# Patient Record
Sex: MALE | Race: WHITE | NOT HISPANIC OR LATINO | Employment: UNEMPLOYED | RURAL
[De-identification: names, ages, dates, MRNs, and addresses within clinical notes are randomized per-mention and may not be internally consistent; named-entity substitution may affect disease eponyms.]

---

## 2024-04-04 ENCOUNTER — OFFICE VISIT (OUTPATIENT)
Dept: FAMILY MEDICINE CLINIC | Facility: CLINIC | Age: 54
End: 2024-04-04
Payer: MEDICAID

## 2024-04-04 VITALS
BODY MASS INDEX: 42.95 KG/M2 | HEIGHT: 70 IN | OXYGEN SATURATION: 96 % | WEIGHT: 300 LBS | HEART RATE: 82 BPM | SYSTOLIC BLOOD PRESSURE: 142 MMHG | DIASTOLIC BLOOD PRESSURE: 86 MMHG

## 2024-04-04 DIAGNOSIS — Z12.2 SCREENING FOR LUNG CANCER: ICD-10-CM

## 2024-04-04 DIAGNOSIS — Z82.49 FAMILY HISTORY OF CHF (CONGESTIVE HEART FAILURE): ICD-10-CM

## 2024-04-04 DIAGNOSIS — Z11.59 NEED FOR HEPATITIS C SCREENING TEST: ICD-10-CM

## 2024-04-04 DIAGNOSIS — Z13.1 SCREENING FOR DIABETES MELLITUS: ICD-10-CM

## 2024-04-04 DIAGNOSIS — E66.01 MORBID (SEVERE) OBESITY DUE TO EXCESS CALORIES: ICD-10-CM

## 2024-04-04 DIAGNOSIS — E03.9 HYPOTHYROIDISM (ACQUIRED): ICD-10-CM

## 2024-04-04 DIAGNOSIS — Z12.11 SCREENING FOR COLON CANCER: ICD-10-CM

## 2024-04-04 DIAGNOSIS — Z12.5 SCREENING FOR PROSTATE CANCER: ICD-10-CM

## 2024-04-04 DIAGNOSIS — R53.83 OTHER FATIGUE: ICD-10-CM

## 2024-04-04 DIAGNOSIS — I10 BENIGN ESSENTIAL HTN: ICD-10-CM

## 2024-04-04 DIAGNOSIS — Z00.00 ANNUAL PHYSICAL EXAM: Primary | ICD-10-CM

## 2024-04-04 DIAGNOSIS — E78.2 MIXED HYPERLIPIDEMIA: ICD-10-CM

## 2024-04-04 DIAGNOSIS — Z72.0 TOBACCO USE: ICD-10-CM

## 2024-04-04 DIAGNOSIS — R00.2 PALPITATIONS: ICD-10-CM

## 2024-04-04 DIAGNOSIS — R25.2 CRAMP AND SPASM: ICD-10-CM

## 2024-04-04 RX ORDER — LEVOTHYROXINE SODIUM 0.15 MG/1
1 TABLET ORAL EVERY MORNING
COMMUNITY

## 2024-04-04 RX ORDER — HYDROCHLOROTHIAZIDE 25 MG/1
1 TABLET ORAL EVERY MORNING
COMMUNITY

## 2024-04-04 RX ORDER — ROSUVASTATIN CALCIUM 10 MG/1
1 TABLET, COATED ORAL DAILY
COMMUNITY
Start: 2024-03-19 | End: 2024-04-04

## 2024-04-04 RX ORDER — LEVOTHYROXINE SODIUM 137 UG/1
1 TABLET ORAL DAILY
COMMUNITY
End: 2024-04-04

## 2024-04-04 NOTE — ASSESSMENT & PLAN NOTE
Labs drawn.  Will order home sleep study.  Informed him if he has not received that in the mail within the next 2 weeks to contact me and let me know.  Call afterwards for results.  Proper sleep hygiene discussed and encouraged.  Proper diet and exercise plan discussed and encouraged.  Return to clinic or ED with any issues or concerns.

## 2024-04-04 NOTE — ASSESSMENT & PLAN NOTE
Labs drawn.  UA completed.  Blood pressure mildly elevated today in clinic.  He will monitor blood pressure daily and keep a log.  Goal blood pressure less than 140/90.  He will let me know if it does not come down to below the goal.  Continue with current medications.  PHQ-9 completed and discussed.  No thoughts of suicide or hurting himself or anyone else.  Proper diet and exercise plan discussed and encouraged.  Will schedule sleep study.  Will schedule colonoscopy.  Will schedule low-dose lung CT scan.  Patient denies pneumonia tetanus shingles vaccine at this time and understands the risks.  He states he will discuss further COVID vaccines with his pharmacist.  Annual dental and eye exams encouraged.  Risk of meds discussed and understood.  Return to clinic or ED with any issues or concerns.

## 2024-04-04 NOTE — ASSESSMENT & PLAN NOTE
EKG completed.  Labs drawn.  Proper diet and exercise plan discussed and encouraged.  Will place cardiology referral.  Go to ED if occurs again.  Stay hydrated with water.  Return to clinic or ED with any issues or concerns.

## 2024-04-04 NOTE — PATIENT INSTRUCTIONS
Obesity, Adult  Obesity is the condition of having too much total body fat. Being overweight or obese means that your weight is greater than what is considered healthy for your body size. Obesity is determined by a measurement called BMI (body mass index). BMI is an estimate of body fat and is calculated from height and weight. For adults, a BMI of 30 or higher is considered obese.  Obesity can lead to other health concerns and major illnesses, including:  Stroke.  Coronary artery disease (CAD).  Type 2 diabetes.  Some types of cancer, including cancers of the colon, breast, uterus, and gallbladder.  High blood pressure (hypertension).  High cholesterol.  Gallbladder stones.  Obesity can also contribute to:  Osteoarthritis.  Sleep apnea.  Infertility problems.  What are the causes?  Common causes of this condition include:  Eating daily meals that are high in calories, sugar, and fat.  Drinking high amounts of sugar-sweetened beverages, such as soft drinks.  Being born with genes that may make you more likely to become obese.  Having a medical condition that causes obesity, including:  Hypothyroidism.  Polycystic ovarian syndrome (PCOS).  Binge-eating disorder.  Cushing syndrome.  Taking certain medicines, such as steroids, antidepressants, and seizure medicines.  Not being physically active (sedentary lifestyle).  Not getting enough sleep.  What increases the risk?  The following factors may make you more likely to develop this condition:  Having a family history of obesity.  Living in an area with limited access to:  [place], recreation centers, or sidewalks.  Healthy food choices, such as grocery stores and [employer] markets.  What are the signs or symptoms?  The main sign of this condition is having too much body fat.  How is this diagnosed?  This condition is diagnosed based on:  Your BMI. If you are an adult with a BMI of 30 or higher, you are considered obese.  Your waist circumference. This measures the  distance around your waistline.  Your skinfold thickness. Your health care provider may gently pinch a fold of your skin and measure it.  You may have other tests to check for underlying conditions.  How is this treated?  Treatment for this condition often includes changing your lifestyle. Treatment may include some or all of the following:  Dietary changes. This may include developing a healthy meal plan.  Regular physical activity. This may include activity that causes your heart to beat faster (aerobic exercise) and strength training. Work with your health care provider to design an exercise program that works for you.  Medicine to help you lose weight if you are unable to lose one pound a week after six weeks of healthy eating and more physical activity.  Treating conditions that cause the obesity (underlying conditions).  Surgery. Surgical options may include gastric banding and gastric bypass. Surgery may be done if:  Other treatments have not helped to improve your condition.  You have a BMI of 40 or higher.  You have life-threatening health problems related to obesity.  Follow these instructions at home:  Eating and drinking    Follow recommendations from your health care provider about what you eat and drink. Your health care provider may advise you to:  Limit fast food, sweets, and processed snack foods.  Choose low-fat options, such as low-fat milk instead of whole milk.  Eat five or more servings of fruits or vegetables every day.  Choose healthy foods when you eat out.  Keep low-fat snacks available.  Limit sugary drinks, such as soda, fruit juice, sweetened iced tea, and flavored milk.  Drink enough water to keep your urine pale yellow.  Do not follow a fad diet. Fad diets can be unhealthy and even dangerous.  Other healthful choices include:  Eat at home more often. This gives you more control over what you eat.  Learn to read food labels. This will help you understand how much food is considered one  serving.  Learn what a healthy serving size is.  Physical activity  Exercise regularly, as told by your health care provider.  Most adults should get up to 150 minutes of moderate-intensity exercise every week.  Ask your health care provider what types of exercise are safe for you and how often you should exercise.  Warm up and stretch before being active.  Cool down and stretch after being active.  Rest between periods of activity.  Lifestyle  Work with your health care provider and a dietitian to set a weight-loss goal that is healthy and reasonable for you.  Limit your screen time.  Find ways to reward yourself that do not involve food.  Do not drink alcohol if:  Your health care provider tells you not to drink.  You are pregnant, may be pregnant, or are planning to become pregnant.  If you drink alcohol:  Limit how much you have to:  0-1 drink a day for women.  0-2 drinks a day for men.  Know how much alcohol is in your drink. In the [place] one drink equals one 12 oz bottle of beer (355 mL), one 5 oz glass of wine (148 mL), or one 1½ oz glass of hard liquor (44 mL).  General instructions  Keep a weight-loss journal to keep track of the food you eat and how much exercise you get.  Take over-the-counter and prescription medicines only as told by your health care provider.  Take vitamins and supplements only as told by your health care provider.  Consider joining a support group. Your health care provider may be able to recommend a support group.  Pay attention to your mental health as obesity can lead to depression or self esteem issues.  Keep all follow-up visits. This is important.  Contact a health care provider if:  You are unable to meet your weight-loss goal after six weeks of dietary and lifestyle changes.  You have trouble breathing.  Summary  Obesity is the condition of having too much total body fat.  Being overweight or obese means that your weight is greater than what is considered healthy for your body  size.  Work with your health care provider and a dietitian to set a weight-loss goal that is healthy and reasonable for you.  Exercise regularly, as told by your health care provider. Ask your health care provider what types of exercise are safe for you and how often you should exercise.  This information is not intended to replace advice given to you by your health care provider. Make sure you discuss any questions you have with your health care provider.  Document Revised: 07/26/2022 Document Reviewed: 07/26/2022  [employer] Patient Education © 2023 [employer]    Exercising to Lose Weight  Getting regular exercise is important for everyone. It is especially important if you are overweight. Being overweight increases your risk of heart disease, stroke, diabetes, high blood pressure, and several types of cancer. Exercising, and reducing the calories you consume, can help you lose weight and improve fitness and health.  Exercise can be moderate or vigorous intensity. To lose weight, most people need to do a certain amount of moderate or vigorous-intensity exercise each week.  How can exercise affect me?  You lose weight when you exercise enough to burn more calories than you eat. Exercise also reduces body fat and builds muscle. The more muscle you have, the more calories you burn. Exercise also:  Improves mood.  Reduces stress and tension.  Improves your overall fitness, flexibility, and endurance.  Increases bone strength.  Moderate-intensity exercise    Moderate-intensity exercise is any activity that gets you moving enough to burn at least three times more energy (calories) than if you were sitting.  Examples of moderate exercise include:  Walking a mile in 15 minutes.  Doing light yard work.  Biking at an easy pace.  Most people should get at least 150 minutes of moderate-intensity exercise a week to maintain their body weight.  Vigorous-intensity exercise  Vigorous-intensity exercise is any activity that gets  you moving enough to burn at least six times more calories than if you were sitting. When you exercise at this intensity, you should be working hard enough that you are not able to carry on a conversation.  Examples of vigorous exercise include:  Running.  Playing a team sport, such as football, basketball, and soccer.  Jumping rope.  Most people should get at least 75 minutes a week of vigorous exercise to maintain their body weight.  What actions can I take to lose weight?  The amount of exercise you need to lose weight depends on:  Your age.  The type of exercise.  Any health conditions you have.  Your overall physical ability.  Talk to your health care provider about how much exercise you need and what types of activities are safe for you.  Nutrition    Make changes to your diet as told by your health care provider or diet and nutrition specialist (dietitian). This may include:  Eating fewer calories.  Eating more protein.  Eating less unhealthy fats.  Eating a diet that includes fresh fruits and vegetables, whole grains, low-fat dairy products, and lean protein.  Avoiding foods with added fat, salt, and sugar.  Drink plenty of water while you exercise to prevent dehydration or heat stroke.  Activity  Choose an activity that you enjoy and set realistic goals. Your health care provider can help you make an exercise plan that works for you.  Exercise at a moderate or vigorous intensity most days of the week.  The intensity of exercise may vary from person to person. You can tell how intense a workout is for you by paying attention to your breathing and heartbeat. Most people will notice their breathing and heartbeat get faster with more intense exercise.  Do resistance training twice each week, such as:  Push-ups.  Sit-ups.  Lifting weights.  Using resistance bands.  Getting short amounts of exercise can be just as helpful as long, structured periods of exercise. If you have trouble finding time to exercise, try  doing these things as part of your daily routine:  Get up, stretch, and walk around every 30 minutes throughout the day.  Go for a walk during your lunch break.  Park your car farther away from your destination.  If you take public transportation, get off one stop early and walk the rest of the way.  Make phone calls while standing up and walking around.  Take the stairs instead of elevators or escalators.  Wear comfortable clothes and shoes with good support.  Do not exercise so much that you hurt yourself, feel dizzy, or get very short of breath.  Where to find more information  [place] Department of Health and Human Services: www.hhs.gov  Centers for Disease Control and Prevention: www.cdc.gov  Contact a health care provider:  Before starting a new exercise program.  If you have questions or concerns about your weight.  If you have a medical problem that keeps you from exercising.  Get help right away if:  You have any of the following while exercising:  Injury.  Dizziness.  Difficulty breathing or shortness of breath that does not go away when you stop exercising.  Chest pain.  Rapid heartbeat.  These symptoms may represent a serious problem that is an emergency. Do not wait to see if the symptoms will go away. Get medical help right away. Call your local emergency services (911 in the [place] Do not drive yourself to the hospital.  Summary  Getting regular exercise is especially important if you are overweight.  Being overweight increases your risk of heart disease, stroke, diabetes, high blood pressure, and several types of cancer.  Losing weight happens when you burn more calories than you eat.  Reducing the amount of calories you eat, and getting regular moderate or vigorous exercise each week, helps you lose weight.  This information is not intended to replace advice given to you by your health care provider. Make sure you discuss any questions you have with your health care provider.  Document Revised:  02/13/2022 Document Reviewed: 02/13/2022  [employer] Patient Education © 2023 [employer]    MyPlate from [employer]    MyPlate is an outline of a general healthy diet based on the Dietary Guidelines for Americans, 4175-1582, from the [place] Department of Agriculture (USDA). It sets guidelines for how much food you should eat from each food group based on your age, sex, and level of physical activity.  What are tips for following MyPlate?  To follow MyPlate recommendations:  Eat a wide variety of fruits and vegetables, grains, and protein foods.  Serve smaller portions and eat less food throughout the day.  Limit portion sizes to avoid overeating.  Enjoy your food.  Get at least 150 minutes of exercise every week. This is about 30 minutes each day, 5 or more days per week.  It can be difficult to have every meal look like MyPlate. Think about MyPlate as eating guidelines for an entire day, rather than each individual meal.  Fruits and vegetables  Make one half of your plate fruits and vegetables.  Eat many different colors of fruits and vegetables each day.  For a 2,000-calorie daily food plan, eat:  2½ cups of vegetables every day.  2 cups of fruit every day.  1 cup is equal to:  1 cup raw or cooked vegetables.  1 cup raw fruit.  1 medium-sized orange, apple, or banana.  1 cup 100% fruit or vegetable juice.  2 cups raw leafy greens, such as lettuce, spinach, or kale.  ½ cup dried fruit.  Grains  One fourth of your plate should be grains.  Make at least half of the grains you eat each day whole grains.  For a 2,000-calorie daily food plan, eat 6 oz of grains every day.  1 oz is equal to:  1 slice bread.  1 cup cereal.  ½ cup cooked rice, cereal, or pasta.  Protein  One fourth of your plate should be protein.  Eat a wide variety of protein foods, including meat, poultry, fish, eggs, beans, nuts, and tofu.  For a 2,000-calorie daily food plan, eat 5½ oz of protein every day.  1 oz is equal to:  1 oz meat, poultry, or fish.  ¼  cup cooked beans.  1 egg.  ½ oz nuts or seeds.  1 Tbsp peanut butter.  Dairy  Drink fat-free or low-fat (1%) milk.  Eat or drink dairy as a side to meals.  For a 2,000-calorie daily food plan, eat or drink 3 cups of dairy every day.  1 cup is equal to:  1 cup milk, yogurt, cottage cheese, or soy milk (soy beverage).  2 oz processed cheese.  1½ oz natural cheese.  Fats, oils, salt, and sugars  Only small amounts of oils are recommended.  Avoid foods that are high in calories and low in nutritional value (empty calories), like foods high in fat or added sugars.  Choose foods that are low in salt (sodium). Choose foods that have less than 140 milligrams (mg) of sodium per serving.  Drink water instead of sugary drinks. Drink enough fluid to keep your urine pale yellow.  Where to find support  Work with your health care provider or a dietitian to develop a customized eating plan that is right for you.  Download an foster (mobile application) to help you track your daily food intake.  Where to find more information  USDA: ChooseMyPlate.gov  Summary  MyPlate is a general guideline for healthy eating from the USDA. It is based on the Dietary Guidelines for Americans, 0214-7868.  In general, fruits and vegetables should take up one half of your plate, grains should take up one fourth of your plate, and protein should take up one fourth of your plate.  This information is not intended to replace advice given to you by your health care provider. Make sure you discuss any questions you have with your health care provider.  Document Revised: 11/08/2021 Document Reviewed: 11/08/2021  [employer] Patient Education © 2023 [employer]    Steps to Quit Smoking  Smoking tobacco is the leading cause of preventable death. It can affect almost every organ in the body. Smoking puts you and those around you at risk for developing many serious chronic diseases.  Quitting smoking can be very challenging. Do not get discouraged if you are not  successful the first time. Some people need to make many attempts to quit before they achieve long-term success. Do your best to stick to your quit plan, and talk with your health care provider if you have any questions or concerns.  How do I get ready to quit?  When you decide to quit smoking, create a plan to help you succeed. Before you quit:  Pick a date to quit. Set a date within the next 2 weeks to give you time to prepare.  Write down the reasons why you are quitting. Keep this list in places where you will see it often.  Tell your family, friends, and co-workers that you are quitting. Support from people you are close to can make quitting easier.  Talk with your health care provider about your options for quitting smoking.  Find out what treatment options are covered by your health insurance.  Identify people, places, things, and activities that make you want to smoke (triggers). Avoid them.  What first steps can I take to quit smoking?  Throw away all cigarettes at home, at work, and in your car.  Throw away smoking accessories, such as ashtrays and lighters.  Clean your car. Make sure to empty the ashtray.  Clean your home, including curtains and carpets.  What strategies can I use to quit smoking?  Talk with your health care provider about combining strategies, such as taking medicines while you are also receiving in-person counseling. Using these two strategies together makes you more likely to succeed in quitting than if you used either strategy on its own.  If you are pregnant or breastfeeding, talk with your health care provider about finding counseling or other support strategies to quit smoking. Do not take medicine to help you quit smoking unless your health care provider tells you to.  Quit right away  Quit smoking completely, instead of gradually reducing how much you smoke over a period of time. Stopping smoking right away may be more successful than gradually quitting.  Attend in-person  counseling to help you build problem-solving skills. You are more likely to succeed in quitting if you attend counseling sessions regularly. Even short sessions of 10 minutes can be effective.  Take medicine  You may take medicines to help you quit smoking. Some medicines require a prescription. You can also purchase over-the-counter medicines. Medicines may have nicotine in them to replace the nicotine in cigarettes. Medicines may:  Help to stop cravings.  Help to relieve withdrawal symptoms.  Your health care provider may recommend:  Nicotine patches, gum, or lozenges.  Nicotine inhalers or sprays.  Non-nicotine medicine that you take by mouth.  Find resources  Find resources and support systems that can help you quit smoking and remain smoke-free after you quit. These resources are most helpful when you use them often. They include:  Online chats with a counselor.  Telephone quitlines.  Printed self-help materials.  Support groups or group counseling.  Text messaging programs.  Mobile phone apps or applications. Use apps that can help you stick to your quit plan by providing reminders, tips, and encouragement. Examples of free services include Quit Guide from the CDC and smokefree.gov    What can I do to make it easier to quit?    Reach out to your family and friends for support and encouragement. Call telephone quitlines, such as 800-QUIT-NOW, reach out to support groups, or work with a counselor for support.  Ask people who smoke to avoid smoking around you.  Avoid places that trigger you to smoke, such as bars, parties, or smoke-break areas at work.  Spend time with people who do not smoke.  Lessen the stress in your life. Stress can be a smoking trigger for some people. To lessen stress, try:  Exercising regularly.  Doing deep-breathing exercises.  Doing yoga.  Meditating.  What benefits will I see if I quit smoking?  Over time, you should start to see positive results, such as:  Improved sense of smell and  taste.  Decreased coughing and sore throat.  Slower heart rate.  Lower blood pressure.  Clearer and healthier skin.  The ability to breathe more easily.  Fewer sick days.  Summary  Quitting smoking can be very challenging. Do not get discouraged if you are not successful the first time. Some people need to make many attempts to quit before they achieve long-term success.  When you decide to quit smoking, create a plan to help you succeed.  Quit smoking right away, not slowly over a period of time.  Find resources and support systems that can help you quit smoking and remain smoke-free after you quit.  This information is not intended to replace advice given to you by your health care provider. Make sure you discuss any questions you have with your health care provider.  Document Revised: 12/09/2022 Document Reviewed: 12/09/2022  Elsevier Patient Education © 2023 [employer]

## 2024-04-04 NOTE — PROGRESS NOTES
Chief Complaint  Establish Care    Subjective          Fred Encarnacion presents to [facility] for preventative yearly exam.   History of Present Illness    Patient presents for annual exam.  Quit smoking in November 2023 and states he smoked over a pack a day for 30 years.  Tries to watch his diet but admits that he could do better.  Walks daily.  Past history of hypertension hyperlipidemia hypothyroidism and doing well on current medications which include hydrochlorothiazide and levothyroxine.  States his levothyroxine was just increased a couple of weeks ago.  No dizziness no headache no chest pain no chest pressure no shortness of breath no trouble breathing no urinary or bowel issues.    States for at least the past month he has felt worsening fatigue and has had random all over muscle/body cramps.  States he has been staying hydrated and drinking plenty of water and the cramps are not stopping.  States he went to urgent care recently and actually just finished a round of Cipro yesterday as they were concerned for possible infection but states this did not help the cramps at all.  Would like to have some blood work completed.  States sometimes he does have some sores on his skin but no obvious tick that he has pulled off.    He also states that he has intermittent leg swelling for the past 1 to 2 years which is why he is on hydrochlorothiazide.  States that this medication does help the swelling.  He also states he has episodes of random palpitations.  States he has never had a good cardiac evaluation and states his father does have congestive heart failure.  Again, no chest pain no chest pressure no shortness of breath.    He is due a colonoscopy.  He is due a low-dose lung CT scan.  States he will discuss COVID vaccines further with his pharmacist.  Denies tetanus and shingles and pneumonia vaccines at this time and understands the risks.    Objective   Vital Signs:   /86    "Pulse 82   Ht 177.8 cm (70\")   Wt 136 kg (300 lb)   SpO2 96%   BMI 43.05 kg/m²     Body mass index is 43.05 kg/m².    Predictive Model Details   No score data available for Risk of Fall        PHQ-9 Depression Screening  Little interest or pleasure in doing things? 0-->not at all   Feeling down, depressed, or hopeless? 0-->not at all   Trouble falling or staying asleep, or sleeping too much?     Feeling tired or having little energy?     Poor appetite or overeating?     Feeling bad about yourself - or that you are a failure or have let yourself or your family down?     Trouble concentrating on things, such as reading the newspaper or watching television?     Moving or speaking so slowly that other people could have noticed? Or the opposite - being so fidgety or restless that you have been moving around a lot more than usual?     Thoughts that you would be better off dead, or of hurting yourself in some way?     PHQ-9 Total Score 0   If you checked off any problems, how difficult have these problems made it for you to do your work, take care of things at home, or get along with other people?       Patient screened positive for depression based on a PHQ-9 score of 0 on 4/4/2024. Follow-up recommendations include:        Immunization History   Administered Date(s) Administered    COVID-19 (MODERNA) 1st,2nd,3rd Dose Monovalent 03/26/2021, 04/19/2021    COVID-19 (MODERNA) Monovalent Original Booster 11/05/2021, 01/24/2022    COVID-19 (UNSPECIFIED) 11/08/2022    Td (TDVAX) 06/01/1996       Review of Systems   Constitutional:  Positive for fatigue. Negative for chills, unexpected weight gain and unexpected weight loss.   HENT: Negative.     Eyes: Negative.    Respiratory: Negative.     Cardiovascular:  Positive for palpitations. Negative for chest pain and leg swelling.   Gastrointestinal: Negative.    Endocrine: Negative for polydipsia, polyphagia and polyuria.   Genitourinary: Negative.    Musculoskeletal:  Positive " for myalgias. Negative for arthralgias, gait problem, joint swelling, neck pain and neck stiffness.   Skin:  Negative for rash, skin lesions and wound.   Neurological: Negative.    Psychiatric/Behavioral: Negative.         Past History:  Medical History: has a past medical history of Hypothyroidism.   Surgical History: has a past surgical history that includes Tonsillectomy and [place] tooth extraction (N/A).   Family History: family history includes Cancer in his maternal grandfather, maternal uncle, paternal aunt, paternal grandmother, and paternal uncle; Diabetes in his maternal aunt; Heart disease in his father; Kidney disease in his paternal grandfather; Stroke in his paternal aunt and paternal uncle.   Social History: reports that he has quit smoking. His smoking use included cigarettes. He started smoking about 15 months ago. He has been exposed to tobacco smoke. He has never used smokeless tobacco. He reports that he does not currently use alcohol. He reports that he does not use drugs.      Current Outpatient Medications:     hydroCHLOROthiazide 25 MG tablet, Take 1 tablet by mouth Every Morning., Disp: , Rfl:     levothyroxine (SYNTHROID, LEVOTHROID) 150 MCG tablet, Take 1 tablet by mouth Every Morning., Disp: , Rfl:     Red Yeast Rice Extract (RED YEAST RICE PO), Take 800 mg by mouth Daily. 2 pills a day, Disp: , Rfl:    Allergies: Patient has no known allergies.    Physical Exam  Constitutional:       Appearance: Normal appearance. He is obese.   HENT:      Head: Normocephalic.      Right Ear: Tympanic membrane, ear canal and external ear normal.      Left Ear: Tympanic membrane, ear canal and external ear normal.      Nose: Nose normal.      Mouth/Throat:      Mouth: Mucous membranes are moist.      Pharynx: Oropharynx is clear.   Eyes:      Extraocular Movements: Extraocular movements intact.      Conjunctiva/sclera: Conjunctivae normal.      Pupils: Pupils are equal, round, and reactive to light.    Cardiovascular:      Rate and Rhythm: Normal rate and regular rhythm.      Heart sounds: Normal heart sounds.   Pulmonary:      Effort: Pulmonary effort is normal.      Breath sounds: Normal breath sounds.   Abdominal:      General: Abdomen is flat. Bowel sounds are normal.      Palpations: Abdomen is soft.   Genitourinary:     Comments: Denied   Musculoskeletal:         General: Normal range of motion.      Cervical back: Normal range of motion.      Right lower leg: No edema.      Left lower leg: No edema.   Skin:     General: Skin is warm.      Findings: No erythema.   Neurological:      General: No focal deficit present.      Mental Status: He is alert and oriented to person, place, and time. Mental status is at baseline.   Psychiatric:         Mood and Affect: Mood normal.         Behavior: Behavior normal.         Thought Content: Thought content normal.         Judgment: Judgment normal.          Result Review :            ECG 12 Lead    Date/Time: 4/4/2024 12:09 PM  Performed by: Joao Grijalva APRN    Authorized by: Joao Grijalva APRN  Previous ECG: no previous ECG available  Comments: Ventricular rate 69 bpm  Sinus rhythm with aberrantly conducted supraventricular complexes                Assessment and Plan    Diagnoses and all orders for this visit:    1. Annual physical exam (Primary)  Assessment & Plan:  Labs drawn.  UA completed.  Blood pressure mildly elevated today in clinic.  He will monitor blood pressure daily and keep a log.  Goal blood pressure less than 140/90.  He will let me know if it does not come down to below the goal.  Continue with current medications.  PHQ-9 completed and discussed.  No thoughts of suicide or hurting himself or anyone else.  Proper diet and exercise plan discussed and encouraged.  Will schedule sleep study.  Will schedule colonoscopy.  Will schedule low-dose lung CT scan.  Patient denies pneumonia tetanus shingles vaccine at this time and understands  the risks.  He states he will discuss further COVID vaccines with his pharmacist.  Annual dental and eye exams encouraged.  Risk of meds discussed and understood.  Return to clinic or ED with any issues or concerns.    Orders:  -     CBC & Differential  -     Comprehensive Metabolic Panel  -     TSH Rfx On Abnormal To Free T4  -     POC Urinalysis Dipstick, Automated; Future    2. Other fatigue  Assessment & Plan:  Labs drawn.  Will order home sleep study.  Informed him if he has not received that in the mail within the next 2 weeks to contact me and let me know.  Call afterwards for results.  Proper sleep hygiene discussed and encouraged.  Proper diet and exercise plan discussed and encouraged.  Return to clinic or ED with any issues or concerns.    Orders:  -     Home Sleep Study; Future  -     CBC & Differential  -     Comprehensive Metabolic Panel  -     TSH Rfx On Abnormal To Free T4  -     Iron  -     Ferritin  -     Folate  -     Vitamin D,25-Hydroxy  -     Vitamin B12  -     POC Urinalysis Dipstick, Automated; Future  -     Lyme Disease Total Antibody With Reflex to Immunoassay  -     Spotted Fever Group AB, IgG/IgM  -     EBVCA(IgG / M)    3. Cramp and spasm  Assessment & Plan:  Labs drawn.  UA completed.  Proper diet and exercise plan discussed and encouraged.  Stay hydrated with water.  Education provided.  Return to clinic or ED with any issues or concerns.  If all testing comes back unremarkable and this continues he will let me know and keep me updated.    Orders:  -     CBC & Differential  -     Comprehensive Metabolic Panel  -     TSH Rfx On Abnormal To Free T4  -     CK  -     Magnesium  -     POC Urinalysis Dipstick, Automated; Future  -     Lyme Disease Total Antibody With Reflex to Immunoassay  -     Spotted Fever Group AB, IgG/IgM  -     EBVCA(IgG / M)    4. Need for hepatitis C screening test  -     Hepatitis C Antibody    5. Screening for prostate cancer  -     PSA Screen    6. Screening for  colon cancer  -     Ambulatory Referral For Screening Colonoscopy    7. Family history of CHF (congestive heart failure)  Assessment & Plan:  Will place cardiology referral for further evaluation.      8. Hypothyroidism (acquired)  -     TSH Rfx On Abnormal To Free T4    9. Benign essential HTN  -     Ambulatory Referral to Cardiology    10. Palpitations  Assessment & Plan:  EKG completed.  Labs drawn.  Proper diet and exercise plan discussed and encouraged.  Will place cardiology referral.  Go to ED if occurs again.  Stay hydrated with water.  Return to clinic or ED with any issues or concerns.    Orders:  -     Ambulatory Referral to Cardiology  -     CBC & Differential  -     Comprehensive Metabolic Panel  -     TSH Rfx On Abnormal To Free T4  -     Magnesium    11. Mixed hyperlipidemia  -     Ambulatory Referral to Cardiology  -     Lipid Panel    12. Screening for lung cancer  -     CT Chest Low Dose Wo; Future    13. Screening for diabetes mellitus  -     Hemoglobin A1c    14. Morbid (severe) obesity due to excess calories    15. Tobacco use    Other orders  -     ECG 12 Lead              Class 3 Severe Obesity (BMI >=40). Obesity-related health conditions include the following: hypertension. Obesity is improving with treatment. BMI is is above average; BMI management plan is completed. We discussed portion control and increasing exercise.       Follow Up   Return in about 4 weeks (around 5/2/2024), or if symptoms worsen or fail to improve.  Patient was given instructions and counseling regarding his condition or for health maintenance advice. Please see specific information pulled into the AVS if appropriate.     FIORDALIZA Hernandez

## 2024-04-04 NOTE — ASSESSMENT & PLAN NOTE
Labs drawn.  UA completed.  Proper diet and exercise plan discussed and encouraged.  Stay hydrated with water.  Education provided.  Return to clinic or ED with any issues or concerns.  If all testing comes back unremarkable and this continues he will let me know and keep me updated.

## 2024-04-09 LAB
25(OH)D3+25(OH)D2 SERPL-MCNC: 29.7 NG/ML (ref 30–100)
ALBUMIN SERPL-MCNC: 4.4 G/DL (ref 3.8–4.9)
ALBUMIN/GLOB SERPL: 1.4 {RATIO} (ref 1.2–2.2)
ALP SERPL-CCNC: 97 IU/L (ref 44–121)
ALT SERPL-CCNC: 12 IU/L (ref 0–44)
AST SERPL-CCNC: 16 IU/L (ref 0–40)
B BURGDOR IGG+IGM SER QL IA: NEGATIVE
BASOPHILS # BLD AUTO: 0 X10E3/UL (ref 0–0.2)
BASOPHILS NFR BLD AUTO: 0 %
BILIRUB SERPL-MCNC: 0.5 MG/DL (ref 0–1.2)
BUN SERPL-MCNC: 20 MG/DL (ref 6–24)
BUN/CREAT SERPL: 16 (ref 9–20)
CALCIUM SERPL-MCNC: 10.3 MG/DL (ref 8.7–10.2)
CHLORIDE SERPL-SCNC: 97 MMOL/L (ref 96–106)
CHOLEST SERPL-MCNC: 213 MG/DL (ref 100–199)
CK SERPL-CCNC: 123 U/L (ref 41–331)
CO2 SERPL-SCNC: 24 MMOL/L (ref 20–29)
CREAT SERPL-MCNC: 1.29 MG/DL (ref 0.76–1.27)
EBV VCA IGG SER IA-ACNC: >600 U/ML (ref 0–17.9)
EBV VCA IGM SER IA-ACNC: <36 U/ML (ref 0–35.9)
EGFRCR SERPLBLD CKD-EPI 2021: 67 ML/MIN/1.73
EOSINOPHIL # BLD AUTO: 0.1 X10E3/UL (ref 0–0.4)
EOSINOPHIL NFR BLD AUTO: 1 %
ERYTHROCYTE [DISTWIDTH] IN BLOOD BY AUTOMATED COUNT: 13.3 % (ref 11.6–15.4)
FERRITIN SERPL-MCNC: 114 NG/ML (ref 30–400)
FOLATE SERPL-MCNC: 5.8 NG/ML
GLOBULIN SER CALC-MCNC: 3.2 G/DL (ref 1.5–4.5)
GLUCOSE SERPL-MCNC: 97 MG/DL (ref 70–99)
HBA1C MFR BLD: 5.7 % (ref 4.8–5.6)
HCT VFR BLD AUTO: 52.1 % (ref 37.5–51)
HCV IGG SERPL QL IA: NON REACTIVE
HDLC SERPL-MCNC: 66 MG/DL
HGB BLD-MCNC: 16.8 G/DL (ref 13–17.7)
IMM GRANULOCYTES # BLD AUTO: 0 X10E3/UL (ref 0–0.1)
IMM GRANULOCYTES NFR BLD AUTO: 0 %
IRON SERPL-MCNC: 51 UG/DL (ref 38–169)
LDLC SERPL CALC-MCNC: 133 MG/DL (ref 0–99)
LYMPHOCYTES # BLD AUTO: 1.9 X10E3/UL (ref 0.7–3.1)
LYMPHOCYTES NFR BLD AUTO: 18 %
MAGNESIUM SERPL-MCNC: 2.2 MG/DL (ref 1.6–2.3)
MCH RBC QN AUTO: 27.2 PG (ref 26.6–33)
MCHC RBC AUTO-ENTMCNC: 32.2 G/DL (ref 31.5–35.7)
MCV RBC AUTO: 84 FL (ref 79–97)
MONOCYTES # BLD AUTO: 0.6 X10E3/UL (ref 0.1–0.9)
MONOCYTES NFR BLD AUTO: 5 %
NEUTROPHILS # BLD AUTO: 7.9 X10E3/UL (ref 1.4–7)
NEUTROPHILS NFR BLD AUTO: 76 %
PLATELET # BLD AUTO: 286 X10E3/UL (ref 150–450)
POTASSIUM SERPL-SCNC: 4.9 MMOL/L (ref 3.5–5.2)
PROT SERPL-MCNC: 7.6 G/DL (ref 6–8.5)
PSA SERPL-MCNC: 2.2 NG/ML (ref 0–4)
RBC # BLD AUTO: 6.17 X10E6/UL (ref 4.14–5.8)
RESULT COMMENT:: NORMAL
RICK SF IGG TITR SER: NORMAL {TITER}
RICK SF IGM TITR SER: NORMAL {TITER}
SODIUM SERPL-SCNC: 137 MMOL/L (ref 134–144)
T4 FREE SERPL-MCNC: 1.18 NG/DL (ref 0.82–1.77)
TRIGL SERPL-MCNC: 81 MG/DL (ref 0–149)
TSH SERPL DL<=0.005 MIU/L-ACNC: 12.7 UIU/ML (ref 0.45–4.5)
VIT B12 SERPL-MCNC: 319 PG/ML (ref 232–1245)
VLDLC SERPL CALC-MCNC: 14 MG/DL (ref 5–40)
WBC # BLD AUTO: 10.5 X10E3/UL (ref 3.4–10.8)

## 2024-04-11 ENCOUNTER — OFFICE VISIT (OUTPATIENT)
Dept: CARDIOLOGY | Facility: CLINIC | Age: 54
End: 2024-04-11
Payer: MEDICAID

## 2024-04-11 VITALS
OXYGEN SATURATION: 96 % | WEIGHT: 308 LBS | DIASTOLIC BLOOD PRESSURE: 70 MMHG | HEART RATE: 75 BPM | RESPIRATION RATE: 20 BRPM | SYSTOLIC BLOOD PRESSURE: 131 MMHG | BODY MASS INDEX: 44.09 KG/M2 | HEIGHT: 70 IN

## 2024-04-11 DIAGNOSIS — M79.89 LEG SWELLING: ICD-10-CM

## 2024-04-11 DIAGNOSIS — R00.2 PALPITATIONS: Primary | ICD-10-CM

## 2024-04-11 DIAGNOSIS — E78.2 MIXED HYPERLIPIDEMIA: ICD-10-CM

## 2024-04-11 DIAGNOSIS — I10 BENIGN ESSENTIAL HTN: ICD-10-CM

## 2024-04-11 PROCEDURE — 1160F RVW MEDS BY RX/DR IN RCRD: CPT | Performed by: INTERNAL MEDICINE

## 2024-04-11 PROCEDURE — 99204 OFFICE O/P NEW MOD 45 MIN: CPT | Performed by: INTERNAL MEDICINE

## 2024-04-11 PROCEDURE — 3075F SYST BP GE 130 - 139MM HG: CPT | Performed by: INTERNAL MEDICINE

## 2024-04-11 PROCEDURE — 93000 ELECTROCARDIOGRAM COMPLETE: CPT | Performed by: INTERNAL MEDICINE

## 2024-04-11 PROCEDURE — 1159F MED LIST DOCD IN RCRD: CPT | Performed by: INTERNAL MEDICINE

## 2024-04-11 PROCEDURE — 3078F DIAST BP <80 MM HG: CPT | Performed by: INTERNAL MEDICINE

## 2024-04-11 RX ORDER — HYDROCHLOROTHIAZIDE 25 MG/1
12.5 TABLET ORAL EVERY MORNING
Status: SHIPPED | COMMUNITY
Start: 2024-04-11

## 2024-04-11 NOTE — ASSESSMENT & PLAN NOTE
Poor circulation.  Started on low-dose diuretics as per primary care with resolution.  Patient following a low-salt diet.  Family history of CHF. Plan:  Transthoracic echocardiogram in the setting of hypertension and leg edema, rule out CHF, especially with family history  Decrease hydrochlorothiazide to 12.5 mg p.o. daily in view of muscle cramping and worsening creatinine

## 2024-04-11 NOTE — PROGRESS NOTES
[facility]  [facility]  [address] [place] [address]  Dept: [phone]  Dept Fax: [phone]    Date: 04/11/2024  Patient: Fred Encarnacion  YOB: 1970    New Patient Office Note    Consult Reason:  Mr. Fred Encarnacion is a 53 y.o. male who presents to the clinic to establish care, seen for Palpitations.   Patient doing well overall.  Infrequent palpitations since he was younger, on and off, not bothering.  Quit smoking November 2023.  Patient denies angina, orthopnea, PND, lightheadedness, syncope or medications side-effects.  Exercise capacity with no significant limitations, except deconditioning.    The following portions of the patient's history were reviewed and updated as appropriate: allergies, current medications, past family history, past medical history, past social history, past surgical history, and problem list.    Medications: No Known Allergies   Current Outpatient Medications   Medication Instructions    hydroCHLOROthiazide 12.5 mg, Oral, Every Morning    levothyroxine (SYNTHROID, LEVOTHROID) 150 MCG tablet 1 tablet, Oral, Every Morning    Red Yeast Rice Extract (RED YEAST RICE PO) 800 mg, Oral, Daily, 2 pills a day       Subjective  Past Medical History:   Diagnosis Date    Hypothyroidism        Past Surgical History:   Procedure Laterality Date    TONSILLECTOMY      WISDOM TOOTH EXTRACTION N/A     bottom       Family History   Problem Relation Age of Onset    Heart disease Father     Diabetes Maternal Aunt     Cancer Maternal Uncle     Stroke Paternal Aunt     Cancer Paternal Aunt     Stroke Paternal Uncle     Cancer Paternal Uncle     Cancer Maternal Grandfather     Cancer Paternal Grandmother     Kidney disease Paternal Grandfather         Social History     Socioeconomic History    Marital status: Single   Tobacco Use    Smoking status: Former     Types: Cigarettes     Start date: 2023     Passive exposure: Past    Smokeless  "tobacco: Never   Vaping Use    Vaping status: Every Day    Substances: Nicotine, Flavoring    Devices: Disposable   Substance and Sexual Activity    Alcohol use: Not Currently    Drug use: Never    Sexual activity: Not Currently       Objective  Vitals:    04/11/24 1302   BP: 131/70   BP Location: Right arm   Patient Position: Sitting   Cuff Size: Adult   Pulse: 75   Resp: 20   SpO2: 96%   Weight: (!) 140 kg (308 lb)   Height: 177.8 cm (70\")     Vitals:    04/11/24 1302   BP: 131/70   BP Location: Right arm   Patient Position: Sitting   Cuff Size: Adult   Pulse: 75   Resp: 20   SpO2: 96%   Weight: (!) 140 kg (308 lb)   Height: 177.8 cm (70\")        Physical Exam  Constitutional:       Appearance: Healthy appearance. Not in distress.   Eyes:      Pupils: Pupils are equal, round, and reactive to light.   HENT:    Mouth/Throat:      Mouth: Mucous membranes are moist.   Neck:      Vascular: No carotid bruit, hepatojugular reflux, JVD or JVR. JVD normal.   Pulmonary:      Effort: Pulmonary effort is normal.      Breath sounds: Normal breath sounds. No wheezing. No rhonchi. No rales.   Chest:      Chest wall: Not tender to palpatation.   Cardiovascular:      PMI at left midclavicular line. Normal rate. Regular rhythm. Normal S1 with normal intensity. Normal S2 with normal intensity.       Murmurs: There is no murmur.      No gallop.  No click. No rub.   Pulses:     Carotid: 4+ bilaterally.     Radial: 4+ bilaterally.     Popliteal: 4+ bilaterally.     Dorsalis pedis: 4+ bilaterally.  Edema:     Peripheral edema absent.   Abdominal:      General: There is no abdominal bruit.   Skin:     General: Skin is warm.   Neurological:      Mental Status: Alert and oriented to person, place and time.              Labs:  Lab Results   Component Value Date     04/04/2024    K 4.9 04/04/2024    CL 97 04/04/2024    CO2 24 04/04/2024    MG 2.2 04/04/2024    BUN 20 04/04/2024    CREATININE 1.29 (H) 04/04/2024    CALCIUM 10.3 (H) " "04/04/2024    BILITOT 0.5 04/04/2024    ALKPHOS 97 04/04/2024    ALT 12 04/04/2024    AST 16 04/04/2024    GLUCOSE 97 04/04/2024    ALBUMIN 4.4 04/04/2024     Lab Results   Component Value Date    WBC 10.5 04/04/2024    HGB 16.8 04/04/2024    HCT 52.1 (H) 04/04/2024     04/04/2024     No results found for: \"APTT\", \"INR\", \"PTT\"  Lab Results   Component Value Date    CKTOTAL 123 04/04/2024     No results found for: \"BNP\", \"PROBNP\"    Lab Results   Component Value Date    CHLPL 213 (H) 04/04/2024    TRIG 81 04/04/2024    HDL 66 04/04/2024     (H) 04/04/2024     Lab Results   Component Value Date    TSH 12.700 (H) 04/04/2024    FREET4 1.18 04/04/2024       The 10-year ASCVD risk score (Mani CASTRO, et al., 2019) is: 3.8%    Values used to calculate the score:      Age: 53 years      Sex: Male      Is Non- : No      Diabetic: No      Tobacco smoker: No      Systolic Blood Pressure: 131 mmHg      Is BP treated: Yes      HDL Cholesterol: 66 mg/dL      Total Cholesterol: 213 mg/dL     CV Diagnostics:    ECG 12 Lead    Date/Time: 4/11/2024 1:02 PM  Performed by: Isidro Sanders MD    Authorized by: Isidro Sanders MD  Comparison: compared with previous ECG from 4/4/2024  Similar to previous ECG  Rhythm: sinus rhythm    Clinical impression: normal ECG          CXR: No results found for this or any previous visit.     ECHO/MUGA: No results found for this or any previous visit.     STRESS TESTS: No results found for this or any previous visit.     CARDIAC CATH: No results found for this or any previous visit.     DEVICES: No valid procedures specified.   HOLTER: No results found for this or any previous visit.     CT/MRI:  No results found for this or any previous visit.    VASCULAR: No valid procedures specified.     Assessment and Plan  Diagnoses and all orders for this visit:    1. Palpitations (Primary)  Assessment & Plan:  Possible arrhythmias.  EKG with aberrantly conducted PACs " suggestive of possible atrial fascicular or ruben fascicular accessory pathway. Plan:  Decrease hydrochlorothiazide to 12.5 mg p.o. daily to avoid electrolyte disturbances, in view of increase in calcemia and creatinine.  Holter monitor 48-hour to detect arrhythmias  Blood pressure control    Orders:  -     Holter Monitor - 48 Hour    2. Benign essential HTN  Assessment & Plan:  Hypertension is stable and controlled  Continue current treatment regimen.  Dietary sodium restriction.  Weight loss.  Regular aerobic exercise.  Ambulatory blood pressure monitoring.  Can consider low-dose beta-blocker next visit in the setting of palpitations and PACs.  Blood pressure will be reassessed in 6 months.    Orders:  -     Adult Transthoracic Echo Complete W/ Cont if Necessary Per Protocol; Future    3. Mixed hyperlipidemia  Assessment & Plan:   Lipid abnormalities are newly identified    Plan:  Lipid lowering therapy not prescribed due to allow for therapeutic lifestyle changes to take effect, as well as intolerance to Crestor (dizziness).    Patient Treatment Goals:   LDL goal is under 100    Followup in 6 months.      4. Leg swelling  Assessment & Plan:  Poor circulation.  Started on low-dose diuretics as per primary care with resolution.  Patient following a low-salt diet.  Family history of CHF. Plan:  Transthoracic echocardiogram in the setting of hypertension and leg edema, rule out CHF, especially with family history  Decrease hydrochlorothiazide to 12.5 mg p.o. daily in view of muscle cramping and worsening creatinine    Orders:  -     Adult Transthoracic Echo Complete W/ Cont if Necessary Per Protocol; Future    Other orders  -     ECG 12 Lead         Return in about 6 months (around 10/11/2024) for Next scheduled follow up, Follow-up with Dr Sanders.    There are no Patient Instructions on file for this visit.    Isidro Sanders MD

## 2024-04-11 NOTE — ASSESSMENT & PLAN NOTE
Possible arrhythmias.  EKG with aberrantly conducted PACs suggestive of possible atrial fascicular or ruben fascicular accessory pathway. Plan:  Decrease hydrochlorothiazide to 12.5 mg p.o. daily to avoid electrolyte disturbances, in view of increase in calcemia and creatinine.  Holter monitor 48-hour to detect arrhythmias  Blood pressure control

## 2024-04-11 NOTE — ASSESSMENT & PLAN NOTE
Hypertension is stable and controlled  Continue current treatment regimen.  Dietary sodium restriction.  Weight loss.  Regular aerobic exercise.  Ambulatory blood pressure monitoring.  Can consider low-dose beta-blocker next visit in the setting of palpitations and PACs.  Blood pressure will be reassessed in 6 months.

## 2024-04-11 NOTE — ASSESSMENT & PLAN NOTE
Lipid abnormalities are newly identified    Plan:  Lipid lowering therapy not prescribed due to allow for therapeutic lifestyle changes to take effect, as well as intolerance to Crestor (dizziness).    Patient Treatment Goals:   LDL goal is under 100    Followup in 6 months.

## 2024-04-12 ENCOUNTER — PATIENT ROUNDING (BHMG ONLY) (OUTPATIENT)
Dept: FAMILY MEDICINE CLINIC | Facility: CLINIC | Age: 54
End: 2024-04-12
Payer: MEDICAID

## 2024-04-12 NOTE — PROGRESS NOTES
April 12, 2024    Hello, may I speak with Fred Encarnacion?    My name is Misha      I am  with [facility]  [address]  [facility] [address] [phone].    Before we get started may I verify your date of birth? 1970    I am calling to officially welcome you to our practice and ask about your recent visit. Is this a good time to talk? yes    Tell me about your visit with us. What things went well?  Everything went well and the staff was great       We're always looking for ways to make our patients' experiences even better. Do you have recommendations on ways we may improve?  no, be able to get in for an office visit in a timely manor when the provider is requesting a patient to come back to go over results.    Overall were you satisfied with your first visit to our practice? yes       I appreciate you taking the time to speak with me today. Is there anything else I can do for you? no      Thank you, and have a great day.

## 2024-04-15 ENCOUNTER — OFFICE VISIT (OUTPATIENT)
Dept: FAMILY MEDICINE CLINIC | Facility: CLINIC | Age: 54
End: 2024-04-15
Payer: MEDICAID

## 2024-04-15 VITALS
HEART RATE: 76 BPM | OXYGEN SATURATION: 97 % | WEIGHT: 303.38 LBS | SYSTOLIC BLOOD PRESSURE: 136 MMHG | DIASTOLIC BLOOD PRESSURE: 76 MMHG | HEIGHT: 70 IN | BODY MASS INDEX: 43.43 KG/M2

## 2024-04-15 DIAGNOSIS — N52.9 ERECTILE DYSFUNCTION, UNSPECIFIED ERECTILE DYSFUNCTION TYPE: ICD-10-CM

## 2024-04-15 DIAGNOSIS — E55.9 VITAMIN D DEFICIENCY: ICD-10-CM

## 2024-04-15 DIAGNOSIS — Z12.5 SCREENING FOR PROSTATE CANCER: ICD-10-CM

## 2024-04-15 DIAGNOSIS — R73.03 PREDIABETES: ICD-10-CM

## 2024-04-15 DIAGNOSIS — E03.9 HYPOTHYROIDISM (ACQUIRED): ICD-10-CM

## 2024-04-15 DIAGNOSIS — R71.8 ELEVATED RED BLOOD CELL COUNT: Primary | ICD-10-CM

## 2024-04-15 DIAGNOSIS — E83.52 SERUM CALCIUM ELEVATED: ICD-10-CM

## 2024-04-15 DIAGNOSIS — E78.2 MIXED HYPERLIPIDEMIA: ICD-10-CM

## 2024-04-15 PROCEDURE — 1160F RVW MEDS BY RX/DR IN RCRD: CPT | Performed by: NURSE PRACTITIONER

## 2024-04-15 PROCEDURE — 3075F SYST BP GE 130 - 139MM HG: CPT | Performed by: NURSE PRACTITIONER

## 2024-04-15 PROCEDURE — 1159F MED LIST DOCD IN RCRD: CPT | Performed by: NURSE PRACTITIONER

## 2024-04-15 PROCEDURE — 3078F DIAST BP <80 MM HG: CPT | Performed by: NURSE PRACTITIONER

## 2024-04-15 PROCEDURE — 99214 OFFICE O/P EST MOD 30 MIN: CPT | Performed by: NURSE PRACTITIONER

## 2024-04-15 RX ORDER — LEVOTHYROXINE SODIUM 0.15 MG/1
150 TABLET ORAL EVERY MORNING
Qty: 30 TABLET | Refills: 0 | Status: SHIPPED | OUTPATIENT
Start: 2024-04-15

## 2024-04-15 RX ORDER — SILDENAFIL CITRATE 20 MG/1
TABLET ORAL
Qty: 30 TABLET | Refills: 2 | Status: SHIPPED | OUTPATIENT
Start: 2024-04-15

## 2024-04-15 NOTE — ASSESSMENT & PLAN NOTE
We discussed recent labs.  Patient will start daily vitamin D supplement and asked 2000 units of vitamin D in it.

## 2024-04-15 NOTE — PROGRESS NOTES
"Chief Complaint  Follow up Labs    Subjective          Fred Encarnacion presents to [facility] PRIMARY [facility]ARE  History of Present Illness    Patient presents to discuss labs that he had drawn on 4//24.  Creatinine slightly elevated at 1.29.  Calcium was elevated at 10.3.  A1c in the prediabetic range at 5.7.    TSH was elevated at 12.7.  He is currently on levothyroxine 150 mg but states he has only been on this dosage for about 2 weeks so we will hold off on increasing the dosage for the time being.    Total cholesterol and LDL were elevated at 213 and 133 respectively.    Vitamin D was low at 29.7.    Red blood cell count and hematocrit were elevated at 6.17 and 52.1 respectively.  Neutrophils elevated at 7.9.    Michael-Barr virus IgG positive.    PSA normal at 2.2.    He states he has noticed trouble achieving and maintaining an erection.  Is wondering if we could try something such as sildenafil.  States he is recently in a new relationship and this would be beneficial.    States in the past he tried rosuvastatin and did not like the way it makes him feel.  He states he is going to try and eat better and exercise more and wants to try and get his cholesterol levels down naturally without medication.  States he wants to try this for 3 months and then will recheck.    Objective   Vital Signs:   /76   Pulse 76   Ht 177.8 cm (70\")   Wt (!) 138 kg (303 lb 6 oz)   SpO2 97%   BMI 43.53 kg/m²     Body mass index is 43.53 kg/m².    Review of Systems   Constitutional:  Negative for chills and fever.   HENT:  Negative for congestion.    Eyes:  Negative for visual disturbance.   Respiratory:  Negative for cough, shortness of breath and wheezing.    Cardiovascular:  Negative for chest pain.   Gastrointestinal:  Negative for abdominal pain, diarrhea, nausea and vomiting.   Genitourinary:  Negative for decreased urine volume, dysuria, frequency, hematuria and urgency.   Musculoskeletal:  Negative for " back pain.   Neurological:  Negative for headache.       Past History:  Medical History: has a past medical history of Hypothyroidism.   Surgical History: has a past surgical history that includes Tonsillectomy and Tiline tooth extraction (N/A).   Family History: family history includes Cancer in his maternal grandfather, maternal uncle, paternal aunt, paternal grandmother, and paternal uncle; Diabetes in his maternal aunt; Heart disease in his father; Kidney disease in his paternal grandfather; Stroke in his paternal aunt and paternal uncle.   Social History: reports that he has quit smoking. His smoking use included cigarettes. He started smoking about 15 months ago. He has been exposed to tobacco smoke. He has never used smokeless tobacco. He reports that he does not currently use alcohol. He reports that he does not use drugs.    PHQ-2 Depression Screening  Little interest or pleasure in doing things?     Feeling down, depressed, or hopeless?     PHQ-2 Total Score          PHQ-9 Depression Screening  Little interest or pleasure in doing things?     Feeling down, depressed, or hopeless?     Trouble falling or staying asleep, or sleeping too much?     Feeling tired or having little energy?     Poor appetite or overeating?     Feeling bad about yourself - or that you are a failure or have let yourself or your family down?     Trouble concentrating on things, such as reading the newspaper or watching television?     Moving or speaking so slowly that other people could have noticed? Or the opposite - being so fidgety or restless that you have been moving around a lot more than usual?     Thoughts that you would be better off dead, or of hurting yourself in some way?     PHQ-9 Total Score     If you checked off any problems, how difficult have these problems made it for you to do your work, take care of things at home, or get along with other people?       PHQ-9 Total Score:        Patient screened positive for  depression based on a PHQ-9 score of 0 on 4/4/2024. Follow-up recommendations include:          Current Outpatient Medications:     hydroCHLOROthiazide 25 MG tablet, Take 0.5 tablets by mouth Every Morning., Disp: , Rfl:     levothyroxine (SYNTHROID, LEVOTHROID) 150 MCG tablet, Take 1 tablet by mouth Every Morning., Disp: 30 tablet, Rfl: 0    sildenafil (REVATIO) 20 MG tablet, Take 2-4 tabs PO qd PRN 0.5-3 hours prior to sex, Disp: 30 tablet, Rfl: 2   (Not in a hospital admission)     Allergies: Patient has no known allergies.    Physical Exam  Constitutional:       Appearance: Normal appearance.   Cardiovascular:      Rate and Rhythm: Normal rate and regular rhythm.      Heart sounds: Normal heart sounds.   Pulmonary:      Effort: Pulmonary effort is normal.      Breath sounds: Normal breath sounds.   Neurological:      General: No focal deficit present.      Mental Status: He is alert and oriented to person, place, and time. Mental status is at baseline.   Psychiatric:         Mood and Affect: Mood normal.         Behavior: Behavior normal.         Thought Content: Thought content normal.         Judgment: Judgment normal.          Result Review :                   Assessment and Plan    Diagnoses and all orders for this visit:    1. Elevated red blood cell count (Primary)  Assessment & Plan:  Repeat labs today.  He quit smoking in November.  If levels remain elevated we discussed would likely be referring to hematology.  Education provided.  Return to clinic or ED with any issues or concerns.    Orders:  -     CBC & Differential    2. Hypothyroidism (acquired)  Assessment & Plan:  Patient will return in 1 month for repeat TSH.  If still not in normal range we will then adjust medications.  Risk discussed and understood.  Education provided.  Return to clinic or ED with any issues or concerns.    Orders:  -     levothyroxine (SYNTHROID, LEVOTHROID) 150 MCG tablet; Take 1 tablet by mouth Every Morning.  Dispense:  30 tablet; Refill: 0    3. Mixed hyperlipidemia  Assessment & Plan:  We discussed recent labs.  Patient denies starting a cholesterol-lowering medication and states he wants to try diet and exercise first.  States he will return in 3 months for repeat lipid check and if not improved he will then consider medication.  Risk discussed and understood.  Proper diet and exercise plan discussed and encouraged.  Return to clinic or ED with any issues or concerns.      4. Screening for prostate cancer  Assessment & Plan:  PSA was normal.  With it being initially over 2 though he will return in 3 months for recheck of PSA to make sure not increasing.      5. Serum calcium elevated  Assessment & Plan:  Recheck labs today.    Orders:  -     Basic Metabolic Panel  -     Calcium, Ionized  -     PTH, Intact    6. Erectile dysfunction, unspecified erectile dysfunction type  Assessment & Plan:  Sildenafil as needed.  Risk discussed and understood.  Education provided.  Return to clinic or ED with any issues or concerns.    Orders:  -     sildenafil (REVATIO) 20 MG tablet; Take 2-4 tabs PO qd PRN 0.5-3 hours prior to sex  Dispense: 30 tablet; Refill: 2    7. Prediabetes    8. Vitamin D deficiency  Assessment & Plan:  We discussed recent labs.  Patient will start daily vitamin D supplement and asked 2000 units of vitamin D in it.                        Follow Up   Return in about 3 months (around 7/15/2024), or if symptoms worsen or fail to improve.  Patient was given instructions and counseling regarding his condition or for health maintenance advice. Please see specific information pulled into the AVS if appropriate.     FIORDALIZA Hernandez

## 2024-04-15 NOTE — ASSESSMENT & PLAN NOTE
We discussed recent labs.  Patient denies starting a cholesterol-lowering medication and states he wants to try diet and exercise first.  States he will return in 3 months for repeat lipid check and if not improved he will then consider medication.  Risk discussed and understood.  Proper diet and exercise plan discussed and encouraged.  Return to clinic or ED with any issues or concerns.

## 2024-04-15 NOTE — ASSESSMENT & PLAN NOTE
PSA was normal.  With it being initially over 2 though he will return in 3 months for recheck of PSA to make sure not increasing.

## 2024-04-15 NOTE — ASSESSMENT & PLAN NOTE
Patient will return in 1 month for repeat TSH.  If still not in normal range we will then adjust medications.  Risk discussed and understood.  Education provided.  Return to clinic or ED with any issues or concerns.

## 2024-04-15 NOTE — ASSESSMENT & PLAN NOTE
Repeat labs today.  He quit smoking in November.  If levels remain elevated we discussed would likely be referring to hematology.  Education provided.  Return to clinic or ED with any issues or concerns.

## 2024-04-15 NOTE — ASSESSMENT & PLAN NOTE
Sildenafil as needed.  Risk discussed and understood.  Education provided.  Return to clinic or ED with any issues or concerns.

## 2024-04-16 ENCOUNTER — PATIENT ROUNDING (BHMG ONLY) (OUTPATIENT)
Dept: CARDIOLOGY | Facility: CLINIC | Age: 54
End: 2024-04-16
Payer: MEDICAID

## 2024-04-16 ENCOUNTER — TELEPHONE (OUTPATIENT)
Dept: CARDIOLOGY | Facility: CLINIC | Age: 54
End: 2024-04-16
Payer: MEDICAID

## 2024-04-16 LAB
BASOPHILS # BLD AUTO: 0 X10E3/UL (ref 0–0.2)
BASOPHILS NFR BLD AUTO: 0 %
BUN SERPL-MCNC: 27 MG/DL (ref 6–24)
BUN/CREAT SERPL: 22 (ref 9–20)
CA-I SERPL ISE-MCNC: 5.2 MG/DL (ref 4.5–5.6)
CALCIUM SERPL-MCNC: 10 MG/DL (ref 8.7–10.2)
CHLORIDE SERPL-SCNC: 99 MMOL/L (ref 96–106)
CO2 SERPL-SCNC: 23 MMOL/L (ref 20–29)
CREAT SERPL-MCNC: 1.23 MG/DL (ref 0.76–1.27)
EGFRCR SERPLBLD CKD-EPI 2021: 71 ML/MIN/1.73
EOSINOPHIL # BLD AUTO: 0.2 X10E3/UL (ref 0–0.4)
EOSINOPHIL NFR BLD AUTO: 2 %
ERYTHROCYTE [DISTWIDTH] IN BLOOD BY AUTOMATED COUNT: 13.7 % (ref 11.6–15.4)
GLUCOSE SERPL-MCNC: 104 MG/DL (ref 70–99)
HCT VFR BLD AUTO: 48.2 % (ref 37.5–51)
HGB BLD-MCNC: 16 G/DL (ref 13–17.7)
IMM GRANULOCYTES # BLD AUTO: 0 X10E3/UL (ref 0–0.1)
IMM GRANULOCYTES NFR BLD AUTO: 0 %
LYMPHOCYTES # BLD AUTO: 1.6 X10E3/UL (ref 0.7–3.1)
LYMPHOCYTES NFR BLD AUTO: 15 %
MCH RBC QN AUTO: 27.9 PG (ref 26.6–33)
MCHC RBC AUTO-ENTMCNC: 33.2 G/DL (ref 31.5–35.7)
MCV RBC AUTO: 84 FL (ref 79–97)
MONOCYTES # BLD AUTO: 0.7 X10E3/UL (ref 0.1–0.9)
MONOCYTES NFR BLD AUTO: 7 %
NEUTROPHILS # BLD AUTO: 7.9 X10E3/UL (ref 1.4–7)
NEUTROPHILS NFR BLD AUTO: 76 %
PLATELET # BLD AUTO: 272 X10E3/UL (ref 150–450)
POTASSIUM SERPL-SCNC: 4.2 MMOL/L (ref 3.5–5.2)
PTH-INTACT SERPL-MCNC: 28 PG/ML (ref 15–65)
RBC # BLD AUTO: 5.74 X10E6/UL (ref 4.14–5.8)
SODIUM SERPL-SCNC: 140 MMOL/L (ref 134–144)
WBC # BLD AUTO: 10.4 X10E3/UL (ref 3.4–10.8)

## 2024-04-16 NOTE — PROGRESS NOTES
April 16, 2024    Hello, may I speak with Fred Encarnacion?    My name is JOSE ARMANDO    I am  with [facility]  [facility]IOLOG[facility]  [address] [place] [address].    Before we get started may I verify your date of birth? 1970    I am calling to officially welcome you to our practice and ask about your recent visit. Is this a good time to talk? yes    Tell me about your visit with us. What things went well?  VERY THOROUGH, ONE OF BEST DOCTORS VISITS        We're always looking for ways to make our patients' experiences even better. Do you have recommendations on ways we may improve?  no           I appreciate you taking the time to speak with me today. Is there anything else I can do for you? Yes      HELPED PT WITH TEST - SENDING TO [facility]

## 2024-04-16 NOTE — TELEPHONE ENCOUNTER
SPOKE WITH PT DURING PT ROUNDING - WOULD LIKE TO DISCUSS TESTING AND SCHEDULING - SAW ECHO SAID DO NOT SCHEDULE

## 2024-04-18 ENCOUNTER — TELEPHONE (OUTPATIENT)
Dept: CARDIOLOGY | Facility: CLINIC | Age: 54
End: 2024-04-18

## 2024-04-18 NOTE — TELEPHONE ENCOUNTER
ECHO scheduled at [facility] 4/22/24 2:45,2:30 arrival. Patient contacted

## 2024-04-18 NOTE — TELEPHONE ENCOUNTER
----- Message from Isidro Sanders MD sent at 4/18/2024  9:48 AM EDT -----  Please inform patient that his Holter monitor was abnormal.  Patient did not record any events.  She had 4 events of fast heart rate on the upper chamber longest 6 beats, benign.  Medical therapy.  Thank you!

## 2024-04-22 ENCOUNTER — OUTSIDE FACILITY SERVICE (OUTPATIENT)
Dept: CARDIOLOGY | Facility: CLINIC | Age: 54
End: 2024-04-22
Payer: MEDICAID

## 2024-04-22 DIAGNOSIS — R79.89 ABNORMAL CBC: Primary | ICD-10-CM

## 2024-04-22 PROCEDURE — 93306 TTE W/DOPPLER COMPLETE: CPT | Performed by: INTERNAL MEDICINE

## 2024-04-29 ENCOUNTER — TELEPHONE (OUTPATIENT)
Dept: FAMILY MEDICINE CLINIC | Facility: CLINIC | Age: 54
End: 2024-04-29
Payer: MEDICAID

## 2024-04-29 NOTE — TELEPHONE ENCOUNTER
Caller: Fred Encarnacion    Relationship to patient: Self    Best call back number: [phone]    Patient is needing: PATIENT CALLED STATING HE MISSED A CALL FROM THE OFFICE. PLEASE ADVISE AND CALL PATIENT BACK

## 2024-05-01 ENCOUNTER — TELEPHONE (OUTPATIENT)
Dept: CARDIOLOGY | Facility: CLINIC | Age: 54
End: 2024-05-01
Payer: MEDICAID

## 2024-05-01 NOTE — TELEPHONE ENCOUNTER
Patient called for results on the echo at [facility] 4/22, ordered by Dr Sanders. Please call [phone]   99

## 2024-05-02 ENCOUNTER — TELEPHONE (OUTPATIENT)
Dept: CARDIOLOGY | Facility: CLINIC | Age: 54
End: 2024-05-02
Payer: MEDICAID

## 2024-05-02 DIAGNOSIS — I51.7 RIGHT VENTRICULAR DILATION: Primary | ICD-10-CM

## 2024-05-02 DIAGNOSIS — I10 BENIGN ESSENTIAL HTN: ICD-10-CM

## 2024-05-02 DIAGNOSIS — M79.89 LEG SWELLING: ICD-10-CM

## 2024-05-02 DIAGNOSIS — G47.33 OBSTRUCTIVE SLEEP APNEA SYNDROME: ICD-10-CM

## 2024-05-02 NOTE — TELEPHONE ENCOUNTER
----- Message from Darrion SWANSON sent at 5/1/2024  8:37 AM EDT -----  Regarding: Test results  Please inform patient that his echo was normal except mild right sided pumping chamber dilatation, possibly from sleep apnea- if patient does not have this diagnosis, we should screen for it with a home sleep study. Please let me know if we need to order one. Thank you!  ----- Message -----  From: Tata Suazo  Sent: 4/23/2024   8:29 AM EDT  To: Isidro Sanders MD

## 2024-05-02 NOTE — TELEPHONE ENCOUNTER
informed patient over the phone that his echo was normal except mild right sided pumping chamber dilatation, possibly from sleep apnea-  patient does not have this diagnosis, and is willing to screen for it with a home sleep study, sleep study questionnaire done.

## 2024-05-08 RX ORDER — SODIUM, POTASSIUM,MAG SULFATES 17.5-3.13G
1 SOLUTION, RECONSTITUTED, ORAL ORAL EVERY 12 HOURS
Qty: 354 ML | Refills: 0 | Status: SHIPPED | OUTPATIENT
Start: 2024-05-08

## 2024-05-13 ENCOUNTER — TELEPHONE (OUTPATIENT)
Dept: GASTROENTEROLOGY | Facility: CLINIC | Age: 54
End: 2024-05-13
Payer: MEDICAID

## 2024-05-13 DIAGNOSIS — D72.9 NEUTROPHILIA: Primary | ICD-10-CM

## 2024-05-13 NOTE — TELEPHONE ENCOUNTER
Hub staff attempted to follow warm transfer process and was unsuccessful     Caller: Mumtaz Olson - [facility] - [address] [address] - [phone]  - [phone] FX    Relationship to patient: Pharmacy    Best call back number: [phone]    Patient is needing: NEEDS PREP DIRECTIONS FOR MOVIPREP PATIENT IS TO START THIS EVENING

## 2024-05-15 ENCOUNTER — OUTSIDE FACILITY SERVICE (OUTPATIENT)
Dept: GASTROENTEROLOGY | Facility: CLINIC | Age: 54
End: 2024-05-15
Payer: MEDICAID

## 2024-05-15 PROCEDURE — 45385 COLONOSCOPY W/LESION REMOVAL: CPT | Performed by: INTERNAL MEDICINE

## 2024-05-15 PROCEDURE — 45388 COLONOSCOPY W/ABLATION: CPT | Performed by: INTERNAL MEDICINE

## 2024-05-15 PROCEDURE — 88305 TISSUE EXAM BY PATHOLOGIST: CPT | Performed by: INTERNAL MEDICINE

## 2024-05-15 PROCEDURE — 45390 COLONOSCOPY W/RESECTION: CPT | Performed by: INTERNAL MEDICINE

## 2024-05-16 ENCOUNTER — LAB REQUISITION (OUTPATIENT)
Dept: LAB | Facility: HOSPITAL | Age: 54
End: 2024-05-16
Payer: MEDICAID

## 2024-05-16 DIAGNOSIS — D12.5 BENIGN NEOPLASM OF SIGMOID COLON: ICD-10-CM

## 2024-05-16 DIAGNOSIS — D12.2 BENIGN NEOPLASM OF ASCENDING COLON: ICD-10-CM

## 2024-05-16 DIAGNOSIS — D12.3 BENIGN NEOPLASM OF TRANSVERSE COLON: ICD-10-CM

## 2024-05-16 DIAGNOSIS — K64.8 OTHER HEMORRHOIDS: ICD-10-CM

## 2024-05-16 DIAGNOSIS — D12.4 BENIGN NEOPLASM OF DESCENDING COLON: ICD-10-CM

## 2024-05-16 DIAGNOSIS — D12.8 BENIGN NEOPLASM OF RECTUM: ICD-10-CM

## 2024-05-16 DIAGNOSIS — Z12.11 ENCOUNTER FOR SCREENING FOR MALIGNANT NEOPLASM OF COLON: ICD-10-CM

## 2024-05-17 LAB — REF LAB TEST METHOD: NORMAL

## 2024-06-04 ENCOUNTER — TELEPHONE (OUTPATIENT)
Dept: FAMILY MEDICINE CLINIC | Facility: CLINIC | Age: 54
End: 2024-06-04
Payer: MEDICAID

## 2024-06-04 DIAGNOSIS — R79.89 ABNORMAL CBC: ICD-10-CM

## 2024-06-04 DIAGNOSIS — E03.9 HYPOTHYROIDISM (ACQUIRED): Primary | ICD-10-CM

## 2024-06-04 NOTE — TELEPHONE ENCOUNTER
Please let patient know that Dr. Hall with hematology reached out and recommended we can recheck CBC in a month or so.  His last hematocrit have returned to normal so lets just monitor and check to make sure his neutrophils returned to normal or remain stable and then from there we can determine if he truly needs to see hematology. He doesn't need to worry about his upcoming hematology appointment. Recheck labs first.  Thanks

## 2024-06-05 NOTE — TELEPHONE ENCOUNTER
Caller: Fred Encarnacion Scot    Relationship: Self    Best call back number: [phone]    What is the best time to reach you: ANYTIME    Who are you requesting to speak with (clinical staff, provider,  specific staff member): PROVIDER OR CLINICAL STAFF    What was the call regarding: PATIENT WOULD LIKE TO KNOW WHY HIS APPOINTMENT WAS CANCELLED WITH HEMATOLOGY. PLEASE ADVISE    Is it okay if the provider responds through MyChart: NO

## 2024-06-24 ENCOUNTER — LAB (OUTPATIENT)
Dept: FAMILY MEDICINE CLINIC | Facility: CLINIC | Age: 54
End: 2024-06-24
Payer: MEDICAID

## 2024-06-25 LAB
BASOPHILS # BLD AUTO: 0 X10E3/UL (ref 0–0.2)
BASOPHILS NFR BLD AUTO: 0 %
EOSINOPHIL # BLD AUTO: 0.3 X10E3/UL (ref 0–0.4)
EOSINOPHIL NFR BLD AUTO: 3 %
ERYTHROCYTE [DISTWIDTH] IN BLOOD BY AUTOMATED COUNT: 14.2 % (ref 11.6–15.4)
HCT VFR BLD AUTO: 47.2 % (ref 37.5–51)
HGB BLD-MCNC: 15.2 G/DL (ref 13–17.7)
IMM GRANULOCYTES # BLD AUTO: 0 X10E3/UL (ref 0–0.1)
IMM GRANULOCYTES NFR BLD AUTO: 0 %
LYMPHOCYTES # BLD AUTO: 1.8 X10E3/UL (ref 0.7–3.1)
LYMPHOCYTES NFR BLD AUTO: 19 %
MCH RBC QN AUTO: 27.9 PG (ref 26.6–33)
MCHC RBC AUTO-ENTMCNC: 32.2 G/DL (ref 31.5–35.7)
MCV RBC AUTO: 87 FL (ref 79–97)
MONOCYTES # BLD AUTO: 0.7 X10E3/UL (ref 0.1–0.9)
MONOCYTES NFR BLD AUTO: 8 %
NEUTROPHILS # BLD AUTO: 6.3 X10E3/UL (ref 1.4–7)
NEUTROPHILS NFR BLD AUTO: 70 %
PLATELET # BLD AUTO: 245 X10E3/UL (ref 150–450)
RBC # BLD AUTO: 5.45 X10E6/UL (ref 4.14–5.8)
TSH SERPL DL<=0.005 MIU/L-ACNC: 1.54 UIU/ML (ref 0.45–4.5)
WBC # BLD AUTO: 9.1 X10E3/UL (ref 3.4–10.8)

## 2024-10-11 ENCOUNTER — OFFICE VISIT (OUTPATIENT)
Dept: CARDIOLOGY | Facility: CLINIC | Age: 54
End: 2024-10-11
Payer: MEDICAID

## 2024-10-11 VITALS
HEIGHT: 70 IN | RESPIRATION RATE: 18 BRPM | BODY MASS INDEX: 45.1 KG/M2 | HEART RATE: 79 BPM | DIASTOLIC BLOOD PRESSURE: 74 MMHG | SYSTOLIC BLOOD PRESSURE: 144 MMHG | OXYGEN SATURATION: 99 % | WEIGHT: 315 LBS

## 2024-10-11 DIAGNOSIS — R00.2 PALPITATIONS: Primary | ICD-10-CM

## 2024-10-11 DIAGNOSIS — I10 BENIGN ESSENTIAL HTN: ICD-10-CM

## 2024-10-11 DIAGNOSIS — E78.2 MIXED HYPERLIPIDEMIA: ICD-10-CM

## 2024-10-11 DIAGNOSIS — M79.89 LEG SWELLING: ICD-10-CM

## 2024-10-11 PROCEDURE — 3077F SYST BP >= 140 MM HG: CPT | Performed by: INTERNAL MEDICINE

## 2024-10-11 PROCEDURE — 3078F DIAST BP <80 MM HG: CPT | Performed by: INTERNAL MEDICINE

## 2024-10-11 PROCEDURE — 1159F MED LIST DOCD IN RCRD: CPT | Performed by: INTERNAL MEDICINE

## 2024-10-11 PROCEDURE — 1160F RVW MEDS BY RX/DR IN RCRD: CPT | Performed by: INTERNAL MEDICINE

## 2024-10-11 PROCEDURE — 93000 ELECTROCARDIOGRAM COMPLETE: CPT | Performed by: INTERNAL MEDICINE

## 2024-10-11 PROCEDURE — 99214 OFFICE O/P EST MOD 30 MIN: CPT | Performed by: INTERNAL MEDICINE

## 2024-10-11 RX ORDER — BISOPROLOL FUMARATE AND HYDROCHLOROTHIAZIDE 2.5; 6.25 MG/1; MG/1
1 TABLET ORAL DAILY
Qty: 90 TABLET | Refills: 3 | Status: SHIPPED | OUTPATIENT
Start: 2024-10-11

## 2024-10-11 NOTE — ASSESSMENT & PLAN NOTE
Poor circulation.  On low-dose diuretics as per primary care with improvement.  Patient following a low-salt diet.  Family history of CHF.  Echo normal systolic and diastolic function.  Plan:  Change HCTZ to 625 mg p.o. daily and combine with bisoprolol 2.5 mg daily for better blood pressure control and heart rate control  Follow-up on BP readings

## 2024-10-11 NOTE — PROGRESS NOTES
"[facility]  [facility] CARDIOLOGY  [address] [place] [address]  Dept: [phone]  Dept Fax: [phone]    Date: 10/11/2024  Patient: Fred Encarnacion  YOB: 1970    Follow Up Office Visit Note    Interval Follow-up  Mr. Fred Encarnacion is a 53 y.o. male who is here for follow-up on Palpitations.    Subjective   Patient doing well with no complaints.  Patient denies angina, orthopnea, PND, palpitations, lightheadedness, syncope or medications side-effects.      The following portions of the patient's history were reviewed and updated as appropriate: allergies, current medications, past family history, past medical history, past social history, past surgical history, and problem list.    Medications: No Known Allergies   Current Outpatient Medications   Medication Instructions    bisoprolol-hydrochlorothiazide (ZIAC) 2.5-6.25 MG per tablet 1 tablet, Oral, Daily    levothyroxine (SYNTHROID, LEVOTHROID) 150 mcg, Oral, Every Morning    sildenafil (REVATIO) 20 MG tablet Take 2-4 tabs PO qd PRN 0.5-3 hours prior to sex       Tobacco Use: Medium Risk (10/11/2024)    Patient History     Smoking Tobacco Use: Former     Smokeless Tobacco Use: Never     Passive Exposure: Past        Objective  Vitals:    10/11/24 1403   BP: 144/74   Pulse: 79   Resp: 18   SpO2: 99%   Weight: (!) 148 kg (326 lb 12.8 oz)   Height: 177.8 cm (70\")   PainSc: 0-No pain      Vitals:    10/11/24 1403   BP: 144/74   Pulse: 79   Resp: 18   SpO2: 99%   Weight: (!) 148 kg (326 lb 12.8 oz)   Height: 177.8 cm (70\")          Physical Exam  Constitutional:       Appearance: Healthy appearance. Not in distress.   Eyes:      Pupils: Pupils are equal, round, and reactive to light.   Neck:      Vascular: No JVR. JVD normal.   Pulmonary:      Effort: Pulmonary effort is normal.      Breath sounds: Normal breath sounds. No wheezing. No rhonchi. No rales.   Chest:      Chest wall: Not tender to " "palpatation.   Cardiovascular:      PMI at left midclavicular line. Normal rate. Regular rhythm. Normal S1 with normal intensity. Normal S2 with normal intensity.       Murmurs: There is no murmur.      No gallop.  No click. No rub.   Pulses:     Intact distal pulses.   Edema:     Peripheral edema absent.   Abdominal:      General: There is no abdominal bruit.   Skin:     General: Skin is warm.   Neurological:      Mental Status: Alert and oriented to person, place and time.              Diagnostic Data  Lab Results   Component Value Date     04/15/2024    K 4.2 04/15/2024    CL 99 04/15/2024    CO2 23 04/15/2024    MG 2.2 04/04/2024    BUN 27 (H) 04/15/2024    CREATININE 1.23 04/15/2024    CALCIUM 10.0 04/15/2024    BILITOT 0.5 04/04/2024    ALKPHOS 97 04/04/2024    ALT 12 04/04/2024    AST 16 04/04/2024    GLUCOSE 104 (H) 04/15/2024    ALBUMIN 4.4 04/04/2024     Lab Results   Component Value Date    WBC 9.1 06/24/2024    HGB 15.2 06/24/2024    HCT 47.2 06/24/2024     06/24/2024     No results found for: \"APTT\", \"INR\", \"PTT\"  Lab Results   Component Value Date    CKTOTAL 123 04/04/2024     No results found for: \"BNP\", \"PROBNP\"    Lab Results   Component Value Date    CHLPL 213 (H) 04/04/2024    TRIG 81 04/04/2024    HDL 66 04/04/2024     (H) 04/04/2024     Lab Results   Component Value Date    TSH 1.540 06/24/2024    FREET4 1.18 04/04/2024       [facility]:    ECG 12 Lead    Date/Time: 10/11/2024 3:00 PM  Performed by: Isidro Sanders MD    Authorized by: Isidro Sanders MD  Comparison: compared with previous ECG from 4/11/2024  Similar to previous ECG  Rhythm: sinus rhythm    Clinical impression: normal ECG  Comments: Normal sinus rhythm          CXR: No results found for this or any previous visit.     ECHO/MUGA: No results found for this or any previous visit.     STRESS TESTS: No results found for this or any previous visit.     CARDIAC CATH: No results found for this or any previous " visit.     DEVICES: No valid procedures specified.   HOLTER: Results for orders placed in visit on 04/11/24    Holter Monitor - 48 Hour    Interpretation Summary    An abnormal monitor study.    No events reported by patient button press/no diary provided.    There were 4 episodes of atrial tachycardia recorded, longest 6 beats [average heart rate 113 bpm, max heart rate 177 bpm], fastest 5 beats [average heart rate 138 bpm,  max heart rate 171 bpm], likely asymptomatic since not recorded by button press/on event log.    There was no atrial fibrillation, VT, cardiac pauses or heart blocks detected.     CT/MRI:  No results found for this or any previous visit.    VASCULAR: No valid procedures specified.     Assessment and Plan  Diagnoses and all orders for this visit:    1. Palpitations (Primary)  Assessment & Plan:  Significant improved.  Infrequent events at bedtime usually.  EKG with aberrantly conducted PACs suggestive of possible atrial fascicular or ruben fascicular accessory pathway.  Holter monitor with frequent PACs at 1% and 4 atrial tachycardia events, asymptomatic.  Echo with normal ejection fraction and normal diastolic function [CARDIOLOGY VISIT - SCAN - [facility] ECHO 4/22/24 (04/23/2024) ].  Plan:  After risk-benefit discussion with patient, change to bisoprolol-HCTZ 2.5 mg - 6.25 mg 1 tab daily  Blood pressure control  Follow-up clinically and symptoms      2. Benign essential HTN  Assessment & Plan:  Hypertension is borderline on hydrochlorothiazide 12.5 mg p.o. daily  Changed to bisoprolol-HCTZ 2.5 - 6.25 mg p.o. daily  Medication changes per orders.  Dietary sodium restriction.  Weight loss.  Regular aerobic exercise.  Ambulatory blood pressure monitoring.  Blood pressure will be reassessedin 6 months.      3. Mixed hyperlipidemia  Assessment & Plan:   Lipid abnormalities are newly identified    Plan:  Lipid lowering therapy not prescribed due to trial of therapeutic lifestyle changes and patient  refusal    Counseled patient on lifestyle modifications to help control hyperlipidemia.   Advised patient to exercise for 150 minutes weekly. (30 minute brisk walk, 5 days a week for example)  Weight Loss encouraged    Patient Treatment Goals:   LDL goal is under 100    Followup in 6 months.      4. Leg swelling  Assessment & Plan:  Poor circulation.  On low-dose diuretics as per primary care with improvement.  Patient following a low-salt diet.  Family history of CHF.  Echo normal systolic and diastolic function.  Plan:  Change HCTZ to 625 mg p.o. daily and combine with bisoprolol 2.5 mg daily for better blood pressure control and heart rate control  Follow-up on BP readings      Other orders  -     bisoprolol-hydrochlorothiazide (ZIAC) 2.5-6.25 MG per tablet; Take 1 tablet by mouth Daily.  Dispense: 90 tablet; Refill: 3  -     ECG 12 Lead         Return in about 6 months (around 4/11/2025) for Follow-up with Dr Sanders.    There are no Patient Instructions on file for this visit.    Isidro Sanders MD

## 2024-10-11 NOTE — ASSESSMENT & PLAN NOTE
Lipid abnormalities are newly identified    Plan:  Lipid lowering therapy not prescribed due to trial of therapeutic lifestyle changes and patient refusal    Counseled patient on lifestyle modifications to help control hyperlipidemia.   Advised patient to exercise for 150 minutes weekly. (30 minute brisk walk, 5 days a week for example)  Weight Loss encouraged    Patient Treatment Goals:   LDL goal is under 100    Followup in 6 months.

## 2024-10-11 NOTE — ASSESSMENT & PLAN NOTE
Significant improved.  Infrequent events at bedtime usually.  EKG with aberrantly conducted PACs suggestive of possible atrial fascicular or ruben fascicular accessory pathway.  Holter monitor with frequent PACs at 1% and 4 atrial tachycardia events, asymptomatic.  Echo with normal ejection fraction and normal diastolic function [CARDIOLOGY VISIT - SCAN - [facility] [place] 4/22/24 (04/23/2024) ].  Plan:  After risk-benefit discussion with patient, change to bisoprolol-HCTZ 2.5 mg - 6.25 mg 1 tab daily  Blood pressure control  Follow-up clinically and symptoms

## 2024-10-11 NOTE — ASSESSMENT & PLAN NOTE
Hypertension is borderline on hydrochlorothiazide 12.5 mg p.o. daily  Changed to bisoprolol-HCTZ 2.5 - 6.25 mg p.o. daily  Medication changes per orders.  Dietary sodium restriction.  Weight loss.  Regular aerobic exercise.  Ambulatory blood pressure monitoring.  Blood pressure will be reassessedin 6 months.

## 2024-12-03 ENCOUNTER — TELEPHONE (OUTPATIENT)
Dept: FAMILY MEDICINE CLINIC | Facility: CLINIC | Age: 54
End: 2024-12-03
Payer: MEDICAID

## 2024-12-03 DIAGNOSIS — E03.9 HYPOTHYROIDISM (ACQUIRED): ICD-10-CM

## 2024-12-03 RX ORDER — LEVOTHYROXINE SODIUM 150 UG/1
150 TABLET ORAL EVERY MORNING
Qty: 30 TABLET | Refills: 0 | Status: SHIPPED | OUTPATIENT
Start: 2024-12-03

## 2024-12-10 ENCOUNTER — OFFICE VISIT (OUTPATIENT)
Dept: FAMILY MEDICINE CLINIC | Facility: CLINIC | Age: 54
End: 2024-12-10
Payer: MEDICAID

## 2024-12-10 VITALS
SYSTOLIC BLOOD PRESSURE: 136 MMHG | BODY MASS INDEX: 45.1 KG/M2 | HEART RATE: 75 BPM | OXYGEN SATURATION: 96 % | WEIGHT: 315 LBS | HEIGHT: 70 IN | DIASTOLIC BLOOD PRESSURE: 86 MMHG

## 2024-12-10 DIAGNOSIS — Z23 IMMUNIZATION DUE: ICD-10-CM

## 2024-12-10 DIAGNOSIS — I10 BENIGN ESSENTIAL HTN: ICD-10-CM

## 2024-12-10 DIAGNOSIS — E78.2 MIXED HYPERLIPIDEMIA: Primary | ICD-10-CM

## 2024-12-10 DIAGNOSIS — E03.9 HYPOTHYROIDISM (ACQUIRED): ICD-10-CM

## 2024-12-10 DIAGNOSIS — R73.03 PREDIABETES: ICD-10-CM

## 2024-12-10 DIAGNOSIS — E55.9 VITAMIN D DEFICIENCY: ICD-10-CM

## 2024-12-10 DIAGNOSIS — N52.9 ERECTILE DYSFUNCTION, UNSPECIFIED ERECTILE DYSFUNCTION TYPE: ICD-10-CM

## 2024-12-10 DIAGNOSIS — Z12.5 SCREENING FOR PROSTATE CANCER: ICD-10-CM

## 2024-12-10 PROBLEM — Z13.1 SCREENING FOR DIABETES MELLITUS: Status: RESOLVED | Noted: 2024-04-04 | Resolved: 2024-12-10

## 2024-12-10 PROCEDURE — 3079F DIAST BP 80-89 MM HG: CPT | Performed by: NURSE PRACTITIONER

## 2024-12-10 PROCEDURE — 1160F RVW MEDS BY RX/DR IN RCRD: CPT | Performed by: NURSE PRACTITIONER

## 2024-12-10 PROCEDURE — 90471 IMMUNIZATION ADMIN: CPT | Performed by: NURSE PRACTITIONER

## 2024-12-10 PROCEDURE — 3075F SYST BP GE 130 - 139MM HG: CPT | Performed by: NURSE PRACTITIONER

## 2024-12-10 PROCEDURE — 1159F MED LIST DOCD IN RCRD: CPT | Performed by: NURSE PRACTITIONER

## 2024-12-10 PROCEDURE — 99214 OFFICE O/P EST MOD 30 MIN: CPT | Performed by: NURSE PRACTITIONER

## 2024-12-10 PROCEDURE — 1126F AMNT PAIN NOTED NONE PRSNT: CPT | Performed by: NURSE PRACTITIONER

## 2024-12-10 PROCEDURE — 90715 TDAP VACCINE 7 YRS/> IM: CPT | Performed by: NURSE PRACTITIONER

## 2024-12-10 RX ORDER — SILDENAFIL CITRATE 20 MG/1
TABLET ORAL
Qty: 30 TABLET | Refills: 2 | Status: CANCELLED | OUTPATIENT
Start: 2024-12-10

## 2024-12-10 RX ORDER — LEVOTHYROXINE SODIUM 150 UG/1
150 TABLET ORAL EVERY MORNING
Qty: 90 TABLET | Refills: 1 | Status: SHIPPED | OUTPATIENT
Start: 2024-12-10

## 2024-12-10 NOTE — PROGRESS NOTES
Chief Complaint  Hypertension, Hyperlipidemia, and Hypothyroidism    Subjective          Fred Encarnacion presents to [facility]  Hypertension  Pertinent negatives include no shortness of breath.   Hyperlipidemia  Exacerbating diseases include hypothyroidism. Pertinent negatives include no shortness of breath.   Hypothyroidism  Patient reports no constipation, diarrhea or fatigue. His past medical history is significant for hyperlipidemia.     History of Present Illness  The patient is a 54-year-old male who presents for a 6 to 7-month checkup and medication refills.    He has been under the care of Dr. Prince, with whom he has had two consultations. An echocardiogram revealed right-sided cardiac enlargement. His HCTZ dosage was reduced by half during his last visit, and bisoprolol was added to his regimen. He has been adhering to a diet rich in lean meats and low in fats, although he admits to a high intake of red meat and a lack of vegetables. His consumption of fast food is limited to once or twice a month. 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 However, he remains active throughout the day while assisting his father.    He has not received an influenza vaccine this year and does not typically receive them due to a perceived association with illness. 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 He has undergone a colonoscopy and lung scan and is aware of the need for a repeat colonoscopy next year.    History of hypothyroidism and doing well on levothyroxine.  States he is no longer dating so denies refills of sildenafil.    SOCIAL HISTORY  The patient vapes but has not smoked a cigarette in over a year.    MEDICATIONS  Current: Bisoprolol/HCTZ, levothyroxine, sildenafil    IMMUNIZATIONS  The patient  "received a tetanus shot around 2012 or 2013.    Objective   Vital Signs:   /86   Pulse 75   Ht 177.8 cm (70\")   Wt (!) 147 kg (323 lb 6 oz)   SpO2 96%   BMI 46.40 kg/m²     Body mass index is 46.4 kg/m².    Review of Systems   Constitutional:  Negative for chills, fatigue and fever.   HENT:  Negative for congestion.    Eyes:  Negative for visual disturbance.   Respiratory:  Negative for cough, shortness of breath and wheezing.    Cardiovascular: Negative.    Gastrointestinal:  Negative for abdominal pain, constipation, diarrhea, nausea and vomiting.   Genitourinary:  Negative for decreased urine volume, dysuria, frequency, hematuria and urgency.   Musculoskeletal:  Negative for back pain.   Skin:  Negative for rash.   Neurological:  Negative for weakness and headache.   Psychiatric/Behavioral:  Negative for suicidal ideas.        Past History:  Medical History: has a past medical history of Hypothyroidism.   Surgical History: has a past surgical history that includes Tonsillectomy and [place] tooth extraction (N/A).   Family History: family history includes Cancer in his maternal grandfather, maternal uncle, paternal aunt, paternal grandmother, and paternal uncle; Diabetes in his maternal aunt; Heart disease in his father; Kidney disease in his paternal grandfather; Stroke in his paternal aunt and paternal uncle.   Social History: reports that he has quit smoking. His smoking use included cigarettes. He started smoking about 23 months ago. He has been exposed to tobacco smoke. He has never used smokeless tobacco. He reports that he does not currently use alcohol. He reports that he does not use drugs.    PHQ-2 Depression Screening  Little interest or pleasure in doing things?     Feeling down, depressed, or hopeless?     PHQ-2 Total Score         PHQ-9 Depression Screening  Little interest or pleasure in doing things?     Feeling down, depressed, or hopeless?     PHQ-2 Total Score     Trouble falling or " staying asleep, or sleeping too much?     Feeling tired or having little energy?     Poor appetite or overeating?     Feeling bad about yourself - or that you are a failure or have let yourself or your family down?     Trouble concentrating on things, such as reading the newspaper or watching television?     Moving or speaking so slowly that other people could have noticed? Or the opposite - being so fidgety or restless that you have been moving around a lot more than usual?     Thoughts that you would be better off dead, or of hurting yourself in some way?     PHQ-9 Total Score     If you checked off any problems, how difficult have these problems made it for you to do your work, take care of things at home, or get along with other people?          PHQ-9 Total Score:        Patient screened positive for depression based on a PHQ-9 score of  on . Follow-up recommendations include:          Current Outpatient Medications:     bisoprolol-hydrochlorothiazide (ZIAC) 2.5-6.25 MG per tablet, Take 1 tablet by mouth Daily., Disp: 90 tablet, Rfl: 3    levothyroxine (SYNTHROID, LEVOTHROID) 150 MCG tablet, Take 1 tablet by mouth Every Morning., Disp: 90 tablet, Rfl: 1    sildenafil (REVATIO) 20 MG tablet, Take 2-4 tabs PO qd PRN 0.5-3 hours prior to sex, Disp: 30 tablet, Rfl: 2   (Not in a hospital admission)     Allergies: Patient has no known allergies.    Physical Exam  Constitutional:       Appearance: Normal appearance.   Eyes:      Conjunctiva/sclera: Conjunctivae normal.      Pupils: Pupils are equal, round, and reactive to light.   Cardiovascular:      Rate and Rhythm: Normal rate and regular rhythm.      Heart sounds: Normal heart sounds.   Pulmonary:      Effort: Pulmonary effort is normal.      Breath sounds: Normal breath sounds.   Neurological:      General: No focal deficit present.      Mental Status: He is alert and oriented to person, place, and time. Mental status is at baseline.   Psychiatric:         Mood  and Affect: Mood normal.         Behavior: Behavior normal.         Thought Content: Thought content normal.         Judgment: Judgment normal.        Physical Exam  Lungs were auscultated.    Result Review :          Results  Laboratory Studies  PSA was normal at 2.2.    Imaging  Echocardiogram showed right sided enlargement of the heart.             Assessment and Plan    Diagnoses and all orders for this visit:    1. Mixed hyperlipidemia (Primary)  -     Lipid Panel    2. Benign essential HTN  -     Comprehensive Metabolic Panel    3. Vitamin D deficiency  -     Vitamin D,25-Hydroxy    4. Prediabetes  -     Hemoglobin A1c    5. Hypothyroidism (acquired)  -     TSH Rfx On Abnormal To Free T4  -     levothyroxine (SYNTHROID, LEVOTHROID) 150 MCG tablet; Take 1 tablet by mouth Every Morning.  Dispense: 90 tablet; Refill: 1    6. Screening for prostate cancer  -     PSA Screen    7. Erectile dysfunction, unspecified erectile dysfunction type    8. Immunization due  -     Tdap Vaccine => 6yo IM (BOOSTRIX/ADACEL); Future      Assessment & Plan  1. Health maintenance.  His PSA levels were previously recorded at 2.2, which is within the normal range but due to the initial PSA being over 2 we will recheck today. He has been informed about the necessity of a repeat colonoscopy in the upcoming year. A comprehensive blood work will be conducted today, including tests for liver and kidney function, blood glucose levels, and a re-evaluation of his PSA levels. A tetanus vaccine will be administered today. Refills for his levothyroxine have been provided.  Continue to follow-up with cardiology.  Goal blood pressure less than 140/90.  Let me or cardio know if gets to or above that.  Proper diet and exercise plan discussed and encouraged.  Risk of meds discussed and understood.  Annual dental and eye exams encouraged.  Patient denies flu shot and understands the risk.  Tdap given today in clinic.  Vaccine information sheet given.   Risk discussed and understood.  Patient tolerated well.  Encouraged to quit vaping.  Return to clinic or ED with any issues or concerns.    2. Hypertension.  He is currently on bisoprolol to manage his blood pressure. He was previously on HCTZ, which was reduced and then bisoprolol was added. He has been adhering to a diet rich in lean meats and low in fats, although he admits to a high intake of red meat and a lack of vegetables. His consumption of fast food is limited to once or twice a month. 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 However, he remains active throughout the day while assisting his father.    PROCEDURE  The patient has undergone a colonoscopy and lung scan in the past.                    Follow Up   Return in about 6 months (around 6/10/2025) for Annual physical.  Patient was given instructions and counseling regarding his condition or for health maintenance advice. Please see specific information pulled into the AVS if appropriate.     Patient or patient representative verbalized consent for the use of Ambient Listening during the visit with  FIORDALIZA Hernandez for chart documentation. 12/10/2024  07:59 EST    FIORDALIZA Hernandez

## 2024-12-11 LAB
25(OH)D3+25(OH)D2 SERPL-MCNC: 33.5 NG/ML (ref 30–100)
ALBUMIN SERPL-MCNC: 4.2 G/DL (ref 3.8–4.9)
ALP SERPL-CCNC: 90 IU/L (ref 44–121)
ALT SERPL-CCNC: 20 IU/L (ref 0–44)
AST SERPL-CCNC: 23 IU/L (ref 0–40)
BILIRUB SERPL-MCNC: 0.5 MG/DL (ref 0–1.2)
BUN SERPL-MCNC: 18 MG/DL (ref 6–24)
BUN/CREAT SERPL: 16 (ref 9–20)
CALCIUM SERPL-MCNC: 9.6 MG/DL (ref 8.7–10.2)
CHLORIDE SERPL-SCNC: 102 MMOL/L (ref 96–106)
CHOLEST SERPL-MCNC: 231 MG/DL (ref 100–199)
CO2 SERPL-SCNC: 23 MMOL/L (ref 20–29)
CREAT SERPL-MCNC: 1.14 MG/DL (ref 0.76–1.27)
EGFRCR SERPLBLD CKD-EPI 2021: 77 ML/MIN/1.73
GLOBULIN SER CALC-MCNC: 3 G/DL (ref 1.5–4.5)
GLUCOSE SERPL-MCNC: 106 MG/DL (ref 70–99)
HBA1C MFR BLD: 5.6 % (ref 4.8–5.6)
HDLC SERPL-MCNC: 45 MG/DL
LDLC SERPL CALC-MCNC: 172 MG/DL (ref 0–99)
POTASSIUM SERPL-SCNC: 4.9 MMOL/L (ref 3.5–5.2)
PROT SERPL-MCNC: 7.2 G/DL (ref 6–8.5)
PSA SERPL-MCNC: 1.7 NG/ML (ref 0–4)
SODIUM SERPL-SCNC: 137 MMOL/L (ref 134–144)
TRIGL SERPL-MCNC: 78 MG/DL (ref 0–149)
TSH SERPL DL<=0.005 MIU/L-ACNC: 2.9 UIU/ML (ref 0.45–4.5)
VLDLC SERPL CALC-MCNC: 14 MG/DL (ref 5–40)

## 2024-12-19 ENCOUNTER — TELEPHONE (OUTPATIENT)
Dept: FAMILY MEDICINE CLINIC | Facility: CLINIC | Age: 54
End: 2024-12-19
Payer: MEDICAID

## 2024-12-19 NOTE — TELEPHONE ENCOUNTER
HUB TO RELAY    Please let patient know that his cholesterol levels are high and have increased.  I would recommend he be on a statin medication such as atorvastatin to help get these levels down to decrease risk of heart attack and stroke.  Encourage healthy low-fat diet and 30 minutes exercise most days of the week.  Would he be interested in starting this medication?  Let me know.     Remaining labs unremarkable and look good.  Thanks

## 2024-12-27 NOTE — TELEPHONE ENCOUNTER
Patient contacted. He is willing to start cholesterol medication. He is aware you are out of town and won't be back until after the new year. Once linnette is back, he will send it in to [facility].

## 2024-12-28 RX ORDER — ATORVASTATIN CALCIUM 20 MG/1
20 TABLET, FILM COATED ORAL DAILY
Qty: 30 TABLET | Refills: 3 | Status: SHIPPED | OUTPATIENT
Start: 2024-12-28

## 2025-04-07 ENCOUNTER — OFFICE VISIT (OUTPATIENT)
Dept: FAMILY MEDICINE CLINIC | Facility: CLINIC | Age: 55
End: 2025-04-07
Payer: MEDICAID

## 2025-04-07 VITALS
OXYGEN SATURATION: 96 % | HEIGHT: 70 IN | WEIGHT: 314.25 LBS | SYSTOLIC BLOOD PRESSURE: 124 MMHG | HEART RATE: 78 BPM | BODY MASS INDEX: 44.99 KG/M2 | DIASTOLIC BLOOD PRESSURE: 78 MMHG

## 2025-04-07 DIAGNOSIS — Z12.2 SCREENING FOR LUNG CANCER: ICD-10-CM

## 2025-04-07 DIAGNOSIS — Z12.5 SCREENING FOR PROSTATE CANCER: ICD-10-CM

## 2025-04-07 DIAGNOSIS — M25.50 ARTHRALGIA, UNSPECIFIED JOINT: ICD-10-CM

## 2025-04-07 DIAGNOSIS — E66.01 MORBID (SEVERE) OBESITY DUE TO EXCESS CALORIES: ICD-10-CM

## 2025-04-07 DIAGNOSIS — E78.2 MIXED HYPERLIPIDEMIA: ICD-10-CM

## 2025-04-07 DIAGNOSIS — N52.9 ERECTILE DYSFUNCTION, UNSPECIFIED ERECTILE DYSFUNCTION TYPE: ICD-10-CM

## 2025-04-07 DIAGNOSIS — E03.9 HYPOTHYROIDISM (ACQUIRED): ICD-10-CM

## 2025-04-07 DIAGNOSIS — Z82.49 FAMILY HISTORY OF CHF (CONGESTIVE HEART FAILURE): ICD-10-CM

## 2025-04-07 DIAGNOSIS — Z12.11 SCREENING FOR COLON CANCER: ICD-10-CM

## 2025-04-07 DIAGNOSIS — I10 BENIGN ESSENTIAL HTN: ICD-10-CM

## 2025-04-07 DIAGNOSIS — Z00.00 ANNUAL PHYSICAL EXAM: Primary | ICD-10-CM

## 2025-04-07 DIAGNOSIS — R73.03 PREDIABETES: ICD-10-CM

## 2025-04-07 PROBLEM — Z11.59 NEED FOR HEPATITIS C SCREENING TEST: Status: RESOLVED | Noted: 2024-04-04 | Resolved: 2025-04-07

## 2025-04-07 PROBLEM — R53.83 FATIGUE: Status: RESOLVED | Noted: 2024-04-04 | Resolved: 2025-04-07

## 2025-04-07 PROBLEM — R25.2 CRAMP AND SPASM: Status: RESOLVED | Noted: 2024-04-04 | Resolved: 2025-04-07

## 2025-04-07 PROBLEM — E83.52 SERUM CALCIUM ELEVATED: Status: RESOLVED | Noted: 2024-04-15 | Resolved: 2025-04-07

## 2025-04-07 PROBLEM — R71.8 ELEVATED RED BLOOD CELL COUNT: Status: RESOLVED | Noted: 2024-04-15 | Resolved: 2025-04-07

## 2025-04-07 RX ORDER — LEVOTHYROXINE SODIUM 150 UG/1
150 TABLET ORAL EVERY MORNING
Qty: 90 TABLET | Refills: 1 | Status: SHIPPED | OUTPATIENT
Start: 2025-04-07

## 2025-04-07 RX ORDER — ATORVASTATIN CALCIUM 20 MG/1
20 TABLET, FILM COATED ORAL DAILY
Qty: 90 TABLET | Refills: 1 | Status: SHIPPED | OUTPATIENT
Start: 2025-04-07

## 2025-04-07 NOTE — PROGRESS NOTES
Chief Complaint  Annual Exam    Subjective          Fred Encarnacion presents to [facility]  History of Present Illness  History of Present Illness  The patient is a 54-year-old male who presents for an annual exam. He has a history of hypertension, hyperlipidemia, prediabetes, hypothyroidism, and erectile dysfunction, all of which are well-managed with his current medications, which include atorvastatin, bisoprolol, hydrochlorothiazide, levothyroxine, and sildenafil as needed. He does not vape anymore but he does smoke cigarettes, no alcohol use, tries to watch his diet, he does stay active. No dizziness, no headache, no chest pain, no chest pressure, no shortness of breath, no trouble breathing, no urinary or bowel issues. Overall he states he is doing well. He had a colonoscopy completed 5/15/2024 and is to do a repeat 1 year after, so it will be next month. He is also due a low dose lung CT scan. He does continue to follow up with his cardiologist Dr. Hart. He had a PSA drawn 12/10/2024, which was normal at 1.7. He does not report pneumonia, shingles COVID vaccines.    He has been experiencing joint pain in his knees, ankles, back, and shoulders for the past few months. He has not sought medical attention for this issue and has not tried Celebrex or meloxicam. He reports no redness or heat in the affected joints. He has been managing with Aleve, which provides some relief but does not completely alleviate the pain.    Supplemental Information  He recently had an eye exam and was told he has 20/15 vision. He does not need glasses for driving but was given progressive lenses to help with reading and seeing small objects on his TV. He has a mole on the nerve of his right eye that is being monitored by his eye doctor every 3 months.     SOCIAL HISTORY  The patient resumed smoking in March 2025. He does not consume alcohol.    MEDICATIONS  atorvastatin, bisoprolol,  "hydrochlorothiazide, levothyroxine, sildenafil    IMMUNIZATIONS  The patient declined the pneumonia, shingles, and COVID-19 vaccines.    Patient Care Team:  Joao Grijalva APRN as PCP - General (Nurse Practitioner)     Objective   Vital Signs:   /78   Pulse 78   Ht 177.8 cm (70\")   Wt (!) 143 kg (314 lb 4 oz)   SpO2 96%   BMI 45.09 kg/m²     Body mass index is 45.09 kg/m².    Review of Systems   Constitutional: Negative.    HENT: Negative.     Eyes: Negative.    Respiratory: Negative.     Cardiovascular: Negative.    Gastrointestinal: Negative.    Endocrine: Negative for polydipsia, polyphagia and polyuria.   Genitourinary: Negative.    Musculoskeletal:  Positive for arthralgias. Negative for joint swelling and myalgias.   Skin: Negative.    Neurological: Negative.    Psychiatric/Behavioral: Negative.         Past History:  Medical History: has a past medical history of Erectile dysfunction, Hypertension, and Hypothyroidism.   Surgical History: has a past surgical history that includes Tonsillectomy; [place] tooth extraction (N/A); and Cyst Removal.   Family History: family history includes Cancer in his maternal grandfather, maternal uncle, paternal aunt, paternal grandmother, and paternal uncle; Diabetes in his maternal aunt; Heart disease in his father; Kidney disease in his paternal grandfather; Stroke in his paternal aunt and paternal uncle.   Social History: reports that he has been smoking cigarettes. He started smoking about 2 years ago. He has been exposed to tobacco smoke. He has never used smokeless tobacco. He reports that he does not currently use alcohol. He reports that he does not use drugs.    PHQ-2 Depression Screening  Little interest or pleasure in doing things? Not at all   Feeling down, depressed, or hopeless? Not at all   PHQ-2 Total Score 0       PHQ-9 Depression Screening  Little interest or pleasure in doing things? Not at all   Feeling down, depressed, or hopeless? Not at " all   PHQ-2 Total Score 0   Trouble falling or staying asleep, or sleeping too much?     Feeling tired or having little energy?     Poor appetite or overeating?     Feeling bad about yourself - or that you are a failure or have let yourself or your family down?     Trouble concentrating on things, such as reading the newspaper or watching television?     Moving or speaking so slowly that other people could have noticed? Or the opposite - being so fidgety or restless that you have been moving around a lot more than usual?     Thoughts that you would be better off dead, or of hurting yourself in some way?     PHQ-9 Total Score     If you checked off any problems, how difficult have these problems made it for you to do your work, take care of things at home, or get along with other people? Not difficult at all        PHQ-9 Total Score:        Patient screened positive for depression based on a PHQ-9 score of  on . Follow-up recommendations include:          Current Outpatient Medications:     atorvastatin (LIPITOR) 20 MG tablet, Take 1 tablet by mouth Daily., Disp: 90 tablet, Rfl: 1    bisoprolol-hydrochlorothiazide (ZIAC) 2.5-6.25 MG per tablet, Take 1 tablet by mouth Daily., Disp: 90 tablet, Rfl: 3    levothyroxine (SYNTHROID, LEVOTHROID) 150 MCG tablet, Take 1 tablet by mouth Every Morning., Disp: 90 tablet, Rfl: 1    sildenafil (REVATIO) 20 MG tablet, Take 2-4 tabs PO qd PRN 0.5-3 hours prior to sex, Disp: 30 tablet, Rfl: 2   (Not in a hospital admission)     Allergies: Patient has no known allergies.    Physical Exam  Constitutional:       Appearance: Normal appearance.   HENT:      Head: Normocephalic.      Right Ear: Tympanic membrane, ear canal and external ear normal.      Left Ear: Tympanic membrane, ear canal and external ear normal.      Nose: Nose normal.      Mouth/Throat:      Mouth: Mucous membranes are moist.      Pharynx: Oropharynx is clear.   Eyes:      Extraocular Movements: Extraocular movements  intact.      Conjunctiva/sclera: Conjunctivae normal.      Pupils: Pupils are equal, round, and reactive to light.   Cardiovascular:      Rate and Rhythm: Normal rate and regular rhythm.      Heart sounds: Normal heart sounds.   Pulmonary:      Effort: Pulmonary effort is normal.      Breath sounds: Normal breath sounds.   Abdominal:      General: Abdomen is flat. Bowel sounds are normal.      Palpations: Abdomen is soft.   Genitourinary:     Comments: Denied   Musculoskeletal:         General: Normal range of motion.      Cervical back: Normal range of motion.      Right lower leg: No edema.      Left lower leg: No edema.   Skin:     General: Skin is warm.   Neurological:      General: No focal deficit present.      Mental Status: He is alert and oriented to person, place, and time. Mental status is at baseline.   Psychiatric:         Mood and Affect: Mood normal.         Behavior: Behavior normal.         Thought Content: Thought content normal.         Judgment: Judgment normal.        Physical Exam  Eyes were examined.  Lungs were auscultated.    Result Review :          Results  Laboratory Studies  PSA was normal at 1.7 on 12/10/2024.             Assessment and Plan    Diagnoses and all orders for this visit:    1. Annual physical exam (Primary)  -     CBC & Differential  -     Comprehensive Metabolic Panel  -     TSH Rfx On Abnormal To Free T4  -     POC Urinalysis Dipstick, Automated; Future    2. Arthralgia, unspecified joint  -     CBC & Differential  -     Comprehensive Metabolic Panel  -     LISA  -     C-reactive Protein  -     Rheumatoid Factor  -     Sedimentation Rate    3. Mixed hyperlipidemia  -     Lipid Panel  -     atorvastatin (LIPITOR) 20 MG tablet; Take 1 tablet by mouth Daily.  Dispense: 90 tablet; Refill: 1    4. Benign essential HTN    5. Prediabetes  -     Hemoglobin A1c    6. Morbid (severe) obesity due to excess calories    7. Hypothyroidism (acquired)  -     levothyroxine (SYNTHROID,  LEVOTHROID) 150 MCG tablet; Take 1 tablet by mouth Every Morning.  Dispense: 90 tablet; Refill: 1    8. Family history of CHF (congestive heart failure)    9. Screening for colon cancer  -     Ambulatory Referral For Screening Colonoscopy    10. Screening for prostate cancer    11. Erectile dysfunction, unspecified erectile dysfunction type    12. Screening for lung cancer  -     CT Chest Low Dose Wo; Future      Assessment & Plan  1. Annual examination.  His PSA levels were within the normal range during the last assessment in December 2024. A comprehensive blood panel will be conducted today to assess liver function, kidney function, cholesterol levels, and blood glucose levels.  UA completed.  Refills for his current medications will be provided and sent to [facility]. A referral for a colonoscopy will be initiated. Additionally, a lung scan will be ordered to monitor for any potential nodules or growths.  Goal blood pressure less than 140/90.  Let me know if gets to above that.  PHQ-9 completed and discussed.  No thoughts of suicide hurting himself or anyone else.  Proper diet and exercise plan discussed and encouraged.  Annual dental and eye exams encouraged.  Continue to follow-up with his eye specialist every 3 months.  He denies pneumonia shingles COVID vaccines and understands the risks.  Will place orders for colonoscopy and low-dose lung CT scan.  Smoking cessation discussed and encouraged.  Continue to follow-up with his cardiologist.  Continue current medications.  Risk of meds discussed and understood.  Return to clinic or ED with any issues or concerns.    2. Joint pain.  Additional blood work will be ordered to rule out any autoimmune disorders such as rheumatoid arthritis or lupus. If the results return normal, a prescription for Celebrex will be provided to manage inflammation in the joints.  He will remind me of this when we discuss lab results.  Risk of meds discussed and understood.   Education provided.  He will let me know a couple weeks after starting the medication how he is doing, sooner if worsens.    PROCEDURE  Colonoscopy was performed on 05/15/2024.            Class 3 Severe Obesity (BMI >=40). Obesity-related health conditions include the following: hypertension and dyslipidemias. Obesity is improving with treatment. BMI is is above average; BMI management plan is completed. We discussed portion control and increasing exercise.       Follow Up   Return in about 6 months (around 10/7/2025), or if symptoms worsen or fail to improve.  Patient was given instructions and counseling regarding his condition or for health maintenance advice. Please see specific information pulled into the AVS if appropriate.     Patient or patient representative verbalized consent for the use of Ambient Listening during the visit with  FIORDALIZA Hernandez for chart documentation. 4/7/2025  08:30 EDT    FIORDALIZA Hernandez

## 2025-04-08 ENCOUNTER — RESULTS FOLLOW-UP (OUTPATIENT)
Dept: FAMILY MEDICINE CLINIC | Facility: CLINIC | Age: 55
End: 2025-04-08
Payer: MEDICAID

## 2025-04-08 DIAGNOSIS — R71.8 ELEVATED RED BLOOD CELL COUNT: Primary | ICD-10-CM

## 2025-04-08 DIAGNOSIS — R79.82 CRP ELEVATED: ICD-10-CM

## 2025-04-08 DIAGNOSIS — R70.0 ELEVATED SED RATE: ICD-10-CM

## 2025-04-08 LAB
ALBUMIN SERPL-MCNC: 4 G/DL (ref 3.8–4.9)
ALP SERPL-CCNC: 120 IU/L (ref 44–121)
ALT SERPL-CCNC: 17 IU/L (ref 0–44)
ANA SER QL: NEGATIVE
AST SERPL-CCNC: 14 IU/L (ref 0–40)
BASOPHILS # BLD AUTO: 0 X10E3/UL (ref 0–0.2)
BASOPHILS NFR BLD AUTO: 0 %
BILIRUB SERPL-MCNC: 0.4 MG/DL (ref 0–1.2)
BUN SERPL-MCNC: 18 MG/DL (ref 6–24)
BUN/CREAT SERPL: 20 (ref 9–20)
CALCIUM SERPL-MCNC: 9.5 MG/DL (ref 8.7–10.2)
CHLORIDE SERPL-SCNC: 103 MMOL/L (ref 96–106)
CHOLEST SERPL-MCNC: 149 MG/DL (ref 100–199)
CO2 SERPL-SCNC: 24 MMOL/L (ref 20–29)
CREAT SERPL-MCNC: 0.9 MG/DL (ref 0.76–1.27)
CRP SERPL-MCNC: 11 MG/L (ref 0–10)
EGFRCR SERPLBLD CKD-EPI 2021: 103 ML/MIN/1.73
EOSINOPHIL # BLD AUTO: 0.2 X10E3/UL (ref 0–0.4)
EOSINOPHIL NFR BLD AUTO: 2 %
ERYTHROCYTE [DISTWIDTH] IN BLOOD BY AUTOMATED COUNT: 14 % (ref 11.6–15.4)
ERYTHROCYTE [SEDIMENTATION RATE] IN BLOOD BY WESTERGREN METHOD: 34 MM/HR (ref 0–30)
GLOBULIN SER CALC-MCNC: 3 G/DL (ref 1.5–4.5)
GLUCOSE SERPL-MCNC: 102 MG/DL (ref 70–99)
HBA1C MFR BLD: 5.7 % (ref 4.8–5.6)
HCT VFR BLD AUTO: 51 % (ref 37.5–51)
HDLC SERPL-MCNC: 39 MG/DL
HGB BLD-MCNC: 16.3 G/DL (ref 13–17.7)
IMM GRANULOCYTES # BLD AUTO: 0 X10E3/UL (ref 0–0.1)
IMM GRANULOCYTES NFR BLD AUTO: 0 %
LDLC SERPL CALC-MCNC: 93 MG/DL (ref 0–99)
LYMPHOCYTES # BLD AUTO: 1.3 X10E3/UL (ref 0.7–3.1)
LYMPHOCYTES NFR BLD AUTO: 17 %
MCH RBC QN AUTO: 27.8 PG (ref 26.6–33)
MCHC RBC AUTO-ENTMCNC: 32 G/DL (ref 31.5–35.7)
MCV RBC AUTO: 87 FL (ref 79–97)
MONOCYTES # BLD AUTO: 0.5 X10E3/UL (ref 0.1–0.9)
MONOCYTES NFR BLD AUTO: 7 %
NEUTROPHILS # BLD AUTO: 5.5 X10E3/UL (ref 1.4–7)
NEUTROPHILS NFR BLD AUTO: 74 %
PLATELET # BLD AUTO: 284 X10E3/UL (ref 150–450)
POTASSIUM SERPL-SCNC: 4.9 MMOL/L (ref 3.5–5.2)
PROT SERPL-MCNC: 7 G/DL (ref 6–8.5)
RBC # BLD AUTO: 5.87 X10E6/UL (ref 4.14–5.8)
RHEUMATOID FACT SERPL-ACNC: <10 IU/ML
SODIUM SERPL-SCNC: 138 MMOL/L (ref 134–144)
TRIGL SERPL-MCNC: 89 MG/DL (ref 0–149)
TSH SERPL DL<=0.005 MIU/L-ACNC: 1.84 UIU/ML (ref 0.45–4.5)
VLDLC SERPL CALC-MCNC: 17 MG/DL (ref 5–40)
WBC # BLD AUTO: 7.5 X10E3/UL (ref 3.4–10.8)

## 2025-04-11 ENCOUNTER — OFFICE VISIT (OUTPATIENT)
Dept: CARDIOLOGY | Facility: CLINIC | Age: 55
End: 2025-04-11
Payer: MEDICAID

## 2025-04-11 VITALS
SYSTOLIC BLOOD PRESSURE: 124 MMHG | OXYGEN SATURATION: 99 % | HEART RATE: 65 BPM | BODY MASS INDEX: 44.81 KG/M2 | HEIGHT: 70 IN | WEIGHT: 313 LBS | DIASTOLIC BLOOD PRESSURE: 78 MMHG | RESPIRATION RATE: 18 BRPM

## 2025-04-11 DIAGNOSIS — R00.2 PALPITATIONS: Primary | ICD-10-CM

## 2025-04-11 DIAGNOSIS — E78.2 MIXED HYPERLIPIDEMIA: ICD-10-CM

## 2025-04-11 DIAGNOSIS — I10 BENIGN ESSENTIAL HTN: ICD-10-CM

## 2025-04-11 PROBLEM — M79.89 LEG SWELLING: Status: RESOLVED | Noted: 2024-04-11 | Resolved: 2025-04-11

## 2025-04-11 PROCEDURE — 1160F RVW MEDS BY RX/DR IN RCRD: CPT | Performed by: INTERNAL MEDICINE

## 2025-04-11 PROCEDURE — 99214 OFFICE O/P EST MOD 30 MIN: CPT | Performed by: INTERNAL MEDICINE

## 2025-04-11 PROCEDURE — 3078F DIAST BP <80 MM HG: CPT | Performed by: INTERNAL MEDICINE

## 2025-04-11 PROCEDURE — 3074F SYST BP LT 130 MM HG: CPT | Performed by: INTERNAL MEDICINE

## 2025-04-11 PROCEDURE — 1159F MED LIST DOCD IN RCRD: CPT | Performed by: INTERNAL MEDICINE

## 2025-04-11 NOTE — ASSESSMENT & PLAN NOTE
Significant improved on medical therapy.  Events are infrequent, short-lived, minimally symptomatic.  Past EKG with aberrantly conducted PACs suggestive of possible atrial fascicular or ruben fascicular accessory pathway.  Holter monitor with frequent PACs at 1% and 4 atrial tachycardia events, asymptomatic.  Echo with normal ejection fraction and normal diastolic function [CARDIOLOGY VISIT - SCAN - [facility] ECHO 4/22/24 (04/23/2024) ].  Plan:  Continue bisoprolol-HCTZ 2.5 mg - 6.25 mg 1 tab daily  Follow-up clinically

## 2025-04-11 NOTE — PROGRESS NOTES
"[facility]  [facility] CARDIOLOGY  [address] [place] [address]  Dept: [phone]  Dept Fax: [phone]    Date: 04/11/2025  Patient: Fred Encarnacion  YOB: 1970    Follow Up Office Visit Note    Interval Follow-up  Mr. Fred Encarnacion is a 54 y.o. male who is here for follow-up on Palpitations.    Subjective   Patient doing well with no acute complaints.  Palpitations currently very infrequent, short duration, minimally symptomatic.  Patient denies angina, orthopnea, PND, lightheadedness, syncope or medications side-effects.      The following portions of the patient's history were reviewed and updated as appropriate: allergies, current medications, past family history, past medical history, past social history, past surgical history, and problem list.    Medications: No Known Allergies   Current Outpatient Medications   Medication Instructions    atorvastatin (LIPITOR) 20 mg, Oral, Daily    bisoprolol-hydrochlorothiazide (ZIAC) 2.5-6.25 MG per tablet 1 tablet, Oral, Daily    levothyroxine (SYNTHROID, LEVOTHROID) 150 mcg, Oral, Every Morning    sildenafil (REVATIO) 20 MG tablet Take 2-4 tabs PO qd PRN 0.5-3 hours prior to sex       Tobacco Use: High Risk (4/11/2025)    Patient History     Smoking Tobacco Use: Every Day     Smokeless Tobacco Use: Never     Passive Exposure: Past        Objective  Vitals:    04/11/25 1045   BP: 124/78   Pulse: 65   Resp: 18   SpO2: 99%   Weight: (!) 142 kg (313 lb)   Height: 177.8 cm (70\")   PainSc: 0-No pain      Vitals:    04/11/25 1045   BP: 124/78   Pulse: 65   Resp: 18   SpO2: 99%   Weight: (!) 142 kg (313 lb)   Height: 177.8 cm (70\")          Physical Exam  Constitutional:       Appearance: Healthy appearance. Not in distress.   Eyes:      Pupils: Pupils are equal, round, and reactive to light.   Neck:      Vascular: No JVR. JVD normal.   Pulmonary:      Effort: Pulmonary effort is normal.      Breath sounds: " "Normal breath sounds. No wheezing. No rhonchi. No rales.   Chest:      Chest wall: Not tender to palpatation.   Cardiovascular:      PMI at left midclavicular line. Normal rate. Regular rhythm. Normal S1 with normal intensity. Normal S2 with normal intensity.       Murmurs: There is no murmur.      No gallop.  No click. No rub.   Pulses:     Intact distal pulses.   Edema:     Peripheral edema absent.   Abdominal:      General: There is no abdominal bruit.   Skin:     General: Skin is warm.   Neurological:      Mental Status: Alert and oriented to person, place and time.              Diagnostic Data  Lab Results   Component Value Date     04/07/2025    K 4.9 04/07/2025     04/07/2025    CO2 24 04/07/2025    MG 2.2 04/04/2024    BUN 18 04/07/2025    CREATININE 0.90 04/07/2025    CALCIUM 9.5 04/07/2025    BILITOT 0.4 04/07/2025    ALKPHOS 120 04/07/2025    ALT 17 04/07/2025    AST 14 04/07/2025    GLUCOSE 102 (H) 04/07/2025    ALBUMIN 4.0 04/07/2025     Lab Results   Component Value Date    WBC 7.5 04/07/2025    HGB 16.3 04/07/2025    HCT 51.0 04/07/2025     04/07/2025     No results found for: \"APTT\", \"INR\", \"PTT\"  Lab Results   Component Value Date    CKTOTAL 123 04/04/2024     No results found for: \"BNP\", \"PROBNP\"    Lab Results   Component Value Date    CHLPL 149 04/07/2025    TRIG 89 04/07/2025    HDL 39 (L) 04/07/2025    LDL 93 04/07/2025     Lab Results   Component Value Date    TSH 1.840 04/07/2025    FREET4 1.18 04/04/2024       CV Diagnostics:  Procedures  CXR: No results found for this or any previous visit.     ECHO/MUGA: No results found for this or any previous visit.     STRESS TESTS: No results found for this or any previous visit.     CARDIAC CATH: No results found for this or any previous visit.     DEVICES: No valid procedures specified.   HOLTER: Results for orders placed in visit on 04/11/24    Holter Monitor - 48 Hour    Interpretation Summary    An abnormal monitor study.    No " events reported by patient button press/no diary provided.    There were 4 episodes of atrial tachycardia recorded, longest 6 beats [average heart rate 113 bpm, max heart rate 177 bpm], fastest 5 beats [average heart rate 138 bpm,  max heart rate 171 bpm], likely asymptomatic since not recorded by button press/on event log.    There was no atrial fibrillation, VT, cardiac pauses or heart blocks detected.     CT/MRI:  No results found for this or any previous visit.    VASCULAR: No valid procedures specified.     Assessment and Plan  Diagnoses and all orders for this visit:    1. Palpitations (Primary)  Assessment & Plan:  Significant improved on medical therapy.  Events are infrequent, short-lived, minimally symptomatic.  Past EKG with aberrantly conducted PACs suggestive of possible atrial fascicular or ruben fascicular accessory pathway.  Holter monitor with frequent PACs at 1% and 4 atrial tachycardia events, asymptomatic.  Echo with normal ejection fraction and normal diastolic function [CARDIOLOGY VISIT - SCAN - [facility] [place] ECHO 4/22/24 (04/23/2024) ].  Plan:  Continue bisoprolol-HCTZ 2.5 mg - 6.25 mg 1 tab daily  Follow-up clinically      2. Benign essential HTN  Assessment & Plan:  Hypertension is stable and controlled  Continue current treatment regimen.  Dietary sodium restriction.  Weight loss.  Regular aerobic exercise.  Ambulatory blood pressure monitoring.  Blood pressure will be reassessed in 6 months.      3. Mixed hyperlipidemia  Assessment & Plan:   Lipid abnormalities are improving with treatment    Plan:  Continue same medication/s without change.  Atorvastatin 20 mg p.o. daily    Discussed medication dosage, use, side effects, and goals of treatment in detail.    Counseled patient on lifestyle modifications to help control hyperlipidemia.   Advised patient to exercise for 150 minutes weekly. (30 minute brisk walk, 5 days a week for example)  Weight Loss encouraged  Patient counseled in regards to heart  healthy, low fat/ low cholesterol/low sat diet, daily exercise for 30 minutes, low to moderate intensity, and weight loss.  Discussed about importance of exercise to increasing his HDL.    Lab Results   Component Value Date    CHLPL 149 04/07/2025    CHLPL 231 (H) 12/10/2024    CHLPL 213 (H) 04/04/2024    TRIG 89 04/07/2025    TRIG 78 12/10/2024    TRIG 81 04/04/2024    HDL 39 (L) 04/07/2025    HDL 45 12/10/2024    HDL 66 04/04/2024    LDL 93 04/07/2025     (H) 12/10/2024     (H) 04/04/2024     Goal of therapy: LDL  mg/dL      Followup in 6 months.           Return in about 6 months (around 10/11/2025) for Follow-up with Dr Sanders.    There are no Patient Instructions on file for this visit.    Isidro Sanders MD

## 2025-04-11 NOTE — ASSESSMENT & PLAN NOTE
Lipid abnormalities are improving with treatment    Plan:  Continue same medication/s without change.  Atorvastatin 20 mg p.o. daily    Discussed medication dosage, use, side effects, and goals of treatment in detail.    Counseled patient on lifestyle modifications to help control hyperlipidemia.   Advised patient to exercise for 150 minutes weekly. (30 minute brisk walk, 5 days a week for example)  Weight Loss encouraged  Patient counseled in regards to heart healthy, low fat/ low cholesterol/low sat diet, daily exercise for 30 minutes, low to moderate intensity, and weight loss.  Discussed about importance of exercise to increasing his HDL.    Lab Results   Component Value Date    CHLPL 149 04/07/2025    CHLPL 231 (H) 12/10/2024    CHLPL 213 (H) 04/04/2024    TRIG 89 04/07/2025    TRIG 78 12/10/2024    TRIG 81 04/04/2024    HDL 39 (L) 04/07/2025    HDL 45 12/10/2024    HDL 66 04/04/2024    LDL 93 04/07/2025     (H) 12/10/2024     (H) 04/04/2024     Goal of therapy: LDL  mg/dL      Followup in 6 months.

## 2025-04-28 ENCOUNTER — LAB (OUTPATIENT)
Dept: FAMILY MEDICINE CLINIC | Facility: CLINIC | Age: 55
End: 2025-04-28
Payer: MEDICAID

## 2025-04-29 LAB
BASOPHILS # BLD AUTO: 0 X10E3/UL (ref 0–0.2)
BASOPHILS NFR BLD AUTO: 0 %
CRP SERPL-MCNC: 11 MG/L (ref 0–10)
EOSINOPHIL # BLD AUTO: 0.2 X10E3/UL (ref 0–0.4)
EOSINOPHIL NFR BLD AUTO: 3 %
ERYTHROCYTE [DISTWIDTH] IN BLOOD BY AUTOMATED COUNT: 14.3 % (ref 11.6–15.4)
ERYTHROCYTE [SEDIMENTATION RATE] IN BLOOD BY WESTERGREN METHOD: 23 MM/HR (ref 0–30)
HCT VFR BLD AUTO: 50.5 % (ref 37.5–51)
HGB BLD-MCNC: 16.1 G/DL (ref 13–17.7)
IMM GRANULOCYTES # BLD AUTO: 0 X10E3/UL (ref 0–0.1)
IMM GRANULOCYTES NFR BLD AUTO: 0 %
LYMPHOCYTES # BLD AUTO: 1.8 X10E3/UL (ref 0.7–3.1)
LYMPHOCYTES NFR BLD AUTO: 20 %
MCH RBC QN AUTO: 27.9 PG (ref 26.6–33)
MCHC RBC AUTO-ENTMCNC: 31.9 G/DL (ref 31.5–35.7)
MCV RBC AUTO: 88 FL (ref 79–97)
MONOCYTES # BLD AUTO: 0.7 X10E3/UL (ref 0.1–0.9)
MONOCYTES NFR BLD AUTO: 7 %
NEUTROPHILS # BLD AUTO: 6 X10E3/UL (ref 1.4–7)
NEUTROPHILS NFR BLD AUTO: 70 %
PLATELET # BLD AUTO: 251 X10E3/UL (ref 150–450)
RBC # BLD AUTO: 5.77 X10E6/UL (ref 4.14–5.8)
WBC # BLD AUTO: 8.8 X10E3/UL (ref 3.4–10.8)